# Patient Record
Sex: MALE | Race: WHITE | Employment: OTHER | URBAN - METROPOLITAN AREA
[De-identification: names, ages, dates, MRNs, and addresses within clinical notes are randomized per-mention and may not be internally consistent; named-entity substitution may affect disease eponyms.]

---

## 2019-01-11 ENCOUNTER — CONVERSION ENCOUNTER (OUTPATIENT)
Dept: ORTHOPEDIC SURGERY | Facility: CLINIC | Age: 49
End: 2019-01-11

## 2019-01-11 ENCOUNTER — OFFICE VISIT CONVERTED (OUTPATIENT)
Dept: ORTHOPEDIC SURGERY | Facility: CLINIC | Age: 49
End: 2019-01-11
Attending: ORTHOPAEDIC SURGERY

## 2019-02-05 ENCOUNTER — HOSPITAL ENCOUNTER (OUTPATIENT)
Dept: GASTROENTEROLOGY | Facility: HOSPITAL | Age: 49
Setting detail: HOSPITAL OUTPATIENT SURGERY
Discharge: HOME | End: 2019-02-05
Attending: INTERNAL MEDICINE

## 2019-02-05 LAB — GLUCOSE BLD-MCNC: 93 MG/DL (ref 70–99)

## 2020-07-10 ENCOUNTER — OFFICE VISIT CONVERTED (OUTPATIENT)
Dept: FAMILY MEDICINE CLINIC | Facility: CLINIC | Age: 50
End: 2020-07-10
Attending: NURSE PRACTITIONER

## 2020-07-10 ENCOUNTER — CONVERSION ENCOUNTER (OUTPATIENT)
Dept: FAMILY MEDICINE CLINIC | Facility: CLINIC | Age: 50
End: 2020-07-10

## 2020-08-03 ENCOUNTER — HOSPITAL ENCOUNTER (OUTPATIENT)
Dept: PHYSICAL THERAPY | Facility: CLINIC | Age: 50
Setting detail: RECURRING SERIES
Discharge: HOME | End: 2020-08-03
Attending: NURSE PRACTITIONER

## 2020-08-31 ENCOUNTER — OFFICE VISIT CONVERTED (OUTPATIENT)
Dept: OTOLARYNGOLOGY | Facility: CLINIC | Age: 50
End: 2020-08-31
Attending: OTOLARYNGOLOGY

## 2021-05-10 NOTE — H&P
History and Physical      Patient Name: Francisco Colin   Patient ID: [id number]   Sex: Male   YOB: 1970    Primary Care Provider: Adrián Causey MD   Referring Provider: Adrián Causey MD    Visit Date: July 10, 2020    Provider: NAMITA Laura   Location: [facility]   Location Address: [address] [place] [address], [address] [place] [address]  [id number]   Location Phone: [phone]          Chief Complaint  · establish care  · dizzy when standing up      History Of Present Illness  Francisco Colin is a 49 year old /White male who presents for evaluation and treatment of:      As a new patient today to establish care.  [facility]  [facility] and [facility] his [facility].    He is complaining of dizziness when he stands up for several months now.  His orthostatic blood pressures were unremarkable.  He does state that he has a history of PE tubes in his ears in the past.  He does use Zyrtec and Flonase every day.    GERD: He states he used to be on Prevacid 30 mg which always controlled his heartburn but states is not on his formulary and they changed him to pantoprazole but he complains is not as good.    Diabetes type 2, non-insulin-dependent: He is currently on Janumet XR, glimepiride as listed.  His wife states his last A1c was 9.0% in October.  He states he had labs done several months ago but his PCP said they miss getting his A1c drawn.  He states he is due to get labs drawn again next month.  He has lost 34 pounds since he was seen by Ortho in January.  He states he is working on weight loss.    Hypertension: Blood pressure is stable at 120/64 on my card is HCTZ 80-12.5 mg.    History of depression and anxiety: PHQ 9 score is 12 today.  He states he feels he is stable on buspirone and venlafaxine as listed.  He states he was only given a prescription for diazepam once or twice, states the last time was when his father passed  away.    Hypothyroidism: He is currently stable on Keldron Thyroid 30 mg daily.    History of chronic pain: He sees Dr. Causey at pain management and he has a pain pump with Dilaudid.    History of asthma: He is currently stable on pro-air inhaler.    History of hyperlipidemia: He is currently stable on Lipitor 40 mg once daily.       Past Medical History  Disease Name Date Onset Notes   Allergies --  --    Anemia --  --    Anxiety --  --    Arthritis --  --    Asthma --  --    Asthma 07/10/2020 --    Broken Bones --  --    Chest pain --  --    Chronic pain 07/10/2020 --    Depression --  --    Diabetes --  --    Diabetes mellitus, type 2 07/10/2020 --    Dizziness 07/10/2020 --    Essential hypertension 07/10/2020 --    Forgetfulness --  --    GERD (gastroesophageal reflux disease) 07/10/2020 --    Heart attack --  --    High blood pressure --  --    High cholesterol --  --    Hyperlipemia --  --    Hypertension --  --    Hypothyroidism 07/10/2020 --    Limb Swelling --  --    Reflux --  --    Seasonal allergies --  --    Shortness of Breath --  --    Sinus trouble --  --    Thyroid disorder --  --          Past Surgical History  Procedure Name Date Notes   Back --  --    carpal tunnel --  --    Colonoscopy 2009 --    Ear drum repair --  --    endoscopy --  --    Joint Surgery --  --    Knee scope with removal of cartilage from knee joint --  --    Nasal septum repair --  --    Pain Pump --  --    Vasectomy --  --          Medication List  Name Date Started Instructions   Keldron Thyroid 30 mg oral tablet  take 1 tablet (30 mg) by oral route once daily   aspirin 81 mg oral tablet,chewable  chew 1 tablet (81 mg) by oral route once daily   atorvastatin 40 mg oral tablet  take 1 tablet (40 mg) by oral route once daily   buspirone 15 mg oral tablet  take 1 tablet (15 mg) by oral route 2 times per day   diazepam 5 mg oral tablet  take 1 tablet (5 mg) by oral route 2 times per day   diclofenac potassium 50 mg oral tablet  --     diclofenac sodium 1 % topical gel  apply 4 grams to the affected area(s) by topical route 4 times per day   dicyclomine 10 mg oral capsule  take 1 capsule (10 mg) by oral route 3 times per day   Dilaudid Pain pump  --    fluticasone propionate 50 mcg/actuation nasal spray,suspension  spray 2 sprays (100 mcg) in each nostril by intranasal route once daily   glimepiride 4 mg oral tablet  take 1 tablet (4 mg) by oral route once daily   Janumet XR 50-1,000 mg oral tablet, ER multiphase 24 hr  take 2 tablets by oral route once daily with the evening meal, swallowing whole. Do not crush, chew and/or divide.   lancets miscellaneous  --    Linzess 290 mcg oral capsule  take 1 capsule (290 mcg) by oral route once daily on an empty stomach at least 30 minutes before 1st meal of the day   melatonin oral  --    Micardis HCT 80-12.5 mg oral tablet  take 1 tablet by oral route once daily   Precision miscellaneous misc  use as directed   ProAir HFA 90 mcg/actuation inhalation HFA aerosol inhaler  inhale 2 puffs (180 mcg) by inhalation route every 4 hours as needed   tizanidine 2 mg oral capsule  take 1 capsule (2 mg) by oral route every 8 hours as needed   trazodone 100 mg oral tablet  take 1 tablet (100 mg) by oral route once daily at bedtime   valacyclovir 500 mg oral tablet  take 1 tablet (500 mg) by oral route once daily   venlafaxine 150 mg oral capsule,extended release 24hr  take 1 capsule (150 mg) by oral route once daily   Zyrtec 10 mg oral tablet  take 1 tablet (10 mg) by oral route once daily         Allergy List  Allergen Name Date Reaction Notes   morphine --  metal taste in mouth --          Family Medical History  Disease Name Relative/Age Notes   Stroke Father/   Father   Heart Disease Mother/   Mother   Cancer, Unspecified Father/   Father   Diabetes, unspecified type Brother/  Father/   Father; Brother   - No Family History of Colorectal Cancer  --    Heart Attack (MI) Father/   --    Diabetes  "Father/  Mother/   --    Skin Carcinoma Father/   --    Blood Diseases Father/   Father   Family history of certain chronic disabling diseases; arthritis Father/   Father         Social History  Finding Status Start/Stop Quantity Notes   Alcohol --  --/-- --  --    Alcohol Use Never --/-- --  does not drink   lives with spouse --  --/-- --  --    . --  --/-- --  --    Recreational Drug Use Never --/-- --  no   Retired. --  --/-- --  --    Tobacco Never --/-- --  never smoker         Review of Systems  · Constitutional  o Admits  o : weight loss, intentional  o Denies  o : fever, fatigue, weight gain  · HENT  o Admits  o : vertigo, lightheadedness, ear fullness  o Denies  o : headaches, sinus pain, nasal congestion, nasal discharge, ear pain  · Cardiovascular  o Denies  o : lower extremity edema, claudication, chest pressure, palpitations  · Respiratory  o Denies  o : shortness of breath, wheezing, cough, hemoptysis, dyspnea on exertion  · Gastrointestinal  o Admits  o : heartburn, reflux  o Denies  o : nausea, vomiting, diarrhea, constipation, abdominal pain  · Genitourinary  o Denies  o : urgency, frequency  · Integument  o Denies  o : rash, itching  · Musculoskeletal  o Admits  o : back pain, hip pain, knee pain  · Psychiatric  o Admits  o : anxiety, depression, difficulty sleeping  o Denies  o : suicidal ideation, homicidal ideation  · Allergic-Immunologic  o Admits  o : sinus allergy symptoms  o Denies  o : frequent illnesses      Vitals  Date Time BP Position Site L\R Cuff Size HR RR TEMP (F) WT  HT  BMI kg/m2 BSA m2 O2 Sat HC       07/10/2020 01:53 /64 Sitting    101 - R  98 240lbs 0oz 5'  11\" 33.47 2.34 96 %        07/10/2020 02:10 /72 Supine    89 - R   07/10/2020 02:10 /74 Sitting    101 - R   07/10/2020 02:10 /70 Sitting    105 - R             Physical Examination  · Constitutional  o Appearance  o : no acute distress, well-nourished  · Head and Face  o Head  o : "   § Inspection  § : atraumatic, normocephalic  · Ears, Nose, Mouth and Throat  o Ears  o :   § External Ears  § : normal  § Otoscopic Examination  § : tympanic membrane dull in appearance-bilaterally, bilateral tympanic membranes with scar tissue also.  o Oral Cavity  o :   § Oral Mucosa  § : moist mucous membranes  o Throat  o :   § Oropharynx  § : no inflammation or lesions present  · Neck  o Thyroid  o : gland size normal, nontender, no nodules or masses present on palpation, symmetric  · Respiratory  o Respiratory Effort  o : breathing comfortably, symmetric chest rise  o Auscultation of Lungs  o : clear to asculatation bilaterally, no wheezes, rales, or rhonchii  · Cardiovascular  o Heart  o :   § Auscultation of Heart  § : regular rate and rhythm, no murmurs, rubs, or gallops  o Peripheral Vascular System  o :   § Extremities  § : no edema  · Lymphatic  o Neck  o : no lymphadenopathy present  · Skin and Subcutaneous Tissue  o General Inspection  o : no rashes present  · Neurologic  o Mental Status Examination  o :   § Orientation  § : grossly oriented to person, place and time  o Gait and Station  o :   § Gait Screening  § : normal gait  · Psychiatric  o General  o : normal mood and affect  o Presence of Abnormal Thoughts  o : no hallucinations, no delusions present, no psychotic thoughts, no homicidal ideation, no suicidal ideation, no evidence of obsessional thinking          Assessment  · Asthma     493.90/J45.909  · Diabetes mellitus, type 2     250.00/E11.9  · Essential hypertension     401.9/I10  · GERD (gastroesophageal reflux disease)     530.81/K21.9  · Hypothyroidism     244.9/E03.9  · Dizziness     780.4/R42  · Chronic pain     338.29/G89.29    Problems Reconciled  Plan  · Orders  o ACO-39: Current medications updated and reviewed () - - 07/10/2020  o ACO-18: Positive screen for clinical depression using a standardized tool and a follow-up plan documented () - - 07/10/2020   positive 12  pts.  o ENT CONSULTATION (ENTCO) - 780.4/R42 - 07/10/2020  o PHYSICAL THERAPY CONSULTATION (PHYT) - 780.4/R42 - 07/10/2020   eval and treat for dizziness  · Medications  o lansoprazole 30 mg oral capsule,delayed release(DR/EC)   SIG: take 1 capsule (30 mg) by oral route once daily before a meal for 90 days   DISP: (90) capsules with 3 refills  Prescribed on 07/10/2020     o pantoprazole 40 mg oral tablet,delayed release (DR/EC)   SIG: take 1 tablet (40 mg) by oral route once daily   DISP: (0) tablet with 0 refills  Discontinued on 07/10/2020     o Medications have been Reconciled  o Transition of Care or Provider Policy  · Instructions  o Continue blood sugar monitoring daily and record. Bring your log to office visits. Call the office for readings below 70 and above 250 or any complications.  o Discussed with patient blood pressure monitoring, hemoglobin A1C levels need to be below 7.0, and LDL (Lipid) goals below 70.  o Patient advised to monitor blood pressure (B/P) at home and journal readings. Patient informed that a B/P reading at home of more than 130/80 is considered hypertension. For readings greater enbn156/90 or higher patient is advised to follow up in the office with readings for management. Patient advised to limit sodium intake.  o Patient is taking medications as prescribed and doing well.   o Patient was educated/instructed on their diagnosis, treatment and medications prior to discharge from the clinic today.  o Patient instructed to seek medical attention urgently for new or worsening symptoms.  o Call the office with any concerns or questions.  · Disposition  o Call or Return if symptoms worsen or persist.     GERD not well controlled on pantoprazole, will send him in Prevacid 30 mg, patient will pay out-of-pocket, good Rx pricing information and card shared with patient.    Will refer him to physical therapy for his chronic dizziness and to ENT due to chronic dizziness and tympanic membrane  dysfunction.    [facility], he may follow-up as needed.             Electronically Signed by: NAMITA Laura -Author on July 10, 2020 05:25:33 PM

## 2021-05-10 NOTE — H&P
"   History and Physical      Patient Name: Francisco Colin   Patient ID: [id number]   Sex: Male   YOB: 1970    Primary Care Provider: Dimple BREAUX   Referring Provider: Dimple BREAUX    Visit Date: August 31, 2020    Provider: Avila Rucker MD   Location[facility]   Location Address: [address] [place] [address], [address]  [id number]   Location Phone: [phone]          Chief Complaint     \"I am having trouble hearing.\"       History Of Present Illness  Francisco Colin is a 50 year old /White male with past medical history significant for chronic back pain who presents to the office today as a consult from Dimple BREAUX for evaluation of his ears. He tells me that his main concern today involves bilateral hearing loss. He tells me that this has been gradual in nature and seems to improve \"about 25%\" when he pops his ears. 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 He denies any family history of hearing loss. He does report a personal history of bilateral tinnitus for which he uses background noise at home to mask. He denies any issues with otalgia, otorrhea, or vertigo. He does report a significant history of otitis media as a child. He also reports previous myringotomy and tube placement in 2003 followed by a left-sided tympanoplasty in 2005. He has not undergone a recent audiogram does report [facility].       Past Medical History  Allergies; Anemia; Anxiety; Arthritis; Asthma; Broken Bones; Chest pain; Chronic pain; Depression; Diabetes mellitus, type 2; Dizziness; Essential hypertension; Forgetfulness; GERD (gastroesophageal reflux disease); Hearing loss; Heart attack; High cholesterol; Hypothyroidism; Seasonal allergies; Shortness of Breath         Past Surgical History  Back; carpal tunnel; Colonoscopy; Ear drum repair; endoscopy; Joint Surgery; Knee scope with " "removal of cartilage from knee joint; Nasal septum repair; Pain Pump; Vasectomy         Medication List  [place] Thyroid 30 mg oral tablet; aspirin 81 mg oral tablet,chewable; atorvastatin 80 mg oral tablet; buspirone 15 mg oral tablet; diazepam 5 mg oral tablet; diclofenac sodium 1 % topical gel; dicyclomine 10 mg oral capsule; fluticasone propionate 50 mcg/actuation nasal spray,suspension; glimepiride 4 mg oral tablet; Janumet 50-1,000 mg oral tablet; Linzess 290 mcg oral capsule; Micardis HCT 80-12.5 mg oral tablet; ProAir HFA 90 mcg/actuation inhalation HFA aerosol inhaler; tizanidine 2 mg oral capsule; trazodone 100 mg oral tablet; valacyclovir 500 mg oral tablet; venlafaxine 150 mg oral capsule,extended release 24hr; Zyrtec 10 mg oral tablet         Allergy List  morphine         Family Medical History  Family history of cancer; Family history of stroke; Family history of heart disease; Family history of diabetes mellitus         Social History  Alcohol Use (Never); lives with spouse; .; Recreational Drug Use (Never); Retired.; Tobacco (Never)         Review of Systems  · Constitutional  o Admits  o : weight loss  o Denies  o : fever, night sweats  · Eyes  o Denies  o : discharge from eye, impaired vision  · HENT  o Admits  o : *See HPI  · Cardiovascular  o Denies  o : chest pain, irregular heart beats  · Respiratory  o Denies  o : shortness of breath, wheezing, coughing up blood  · Gastrointestinal  o Denies  o : heartburn, reflux, vomiting blood  · Genitourinary  o Denies  o : frequency  · Integument  o Denies  o : rash, skin dryness  · Neurologic  o Denies  o : seizures, loss of balance, loss of consciousness, dizziness  · Endocrine  o Denies  o : cold intolerance, heat intolerance  · Heme-Lymph  o Denies  o : easy bleeding, anemia      Vitals  Date Time BP Position Site L\R Cuff Size HR RR TEMP (F) WT  HT  BMI kg/m2 BSA m2 O2 Sat        08/31/2020 09:18 AM        99.1 247lbs 0oz 5'  11\" 34.45 2.37  "          Physical Examination  · Constitutional  o Appearance  o : well developed, well-nourished, alert and in no acute distress, voice clear and strong  · Head and Face  o Head  o :   § Inspection  § : no deformities or lesions  o Face  o :   § Inspection  § : No facial lesions; House-Brackmann I/VI bilaterally  § Palpation  § : No TMJ crepitus nor  muscle tenderness bilaterally  · Eyes  o Vision  o :   § Visual Fields  § : Extraocular movements are intact. No spontaneous or gaze-induced nystagmus.  o Conjunctivae  o : clear  o Sclerae  o : clear  o Pupils and Irises  o : pupils equal, round, and reactive to light.   · Ears, Nose, Mouth and Throat  o Ears  o :   § External Ears  § : appearance within normal limits, no lesions present aside from left postauricular scar  § Otoscopic Examination  § : Bilateral external auditory canals are normal in appearance. Left tympanic membrane with patches of myringosclerosis. Right tympanic membrane is slightly retracted.  § Hearing  § : intact to conversational voice both ears  o Nose  o :   § External Nose  § : appearance normal  § Intranasal Exam  § : mucosa within normal limits, vestibules normal, no intranasal lesions present, septum midline, sinuses non tender to percussion  o Oral Cavity  o :   § Oral Mucosa  § : oral mucosa normal without pallor or cyanosis  § Lips  § : lip appearance normal  § Teeth  § : Partial dentition for age  § Gums  § : gums pink, non-swollen, no bleeding present  § Tongue  § : tongue appearance normal; normal mobility  § Palate  § : hard palate normal, soft palate appearance normal with symmetric mobility  o Throat  o :   § Oropharynx  § : no inflammation or lesions present, tonsils within normal limits  · Neck  o Inspection/Palpation  o : normal appearance, no masses or tenderness, trachea midline; thyroid size normal, nontender, no nodules or masses present on palpation  · Respiratory  o Respiratory Effort  o : breathing  unlabored  o Inspection of Chest  o : normal appearance, no retractions  · Cardiovascular  o Heart  o : regular rate and rhythm  · Lymphatic  o Neck  o : no lymphadenopathy present  o Supraclavicular Nodes  o : no lymphadenopathy present  o Preauricular Nodes  o : no lymphadenopathy present  · Skin and Subcutaneous Tissue  o General Inspection  o : Regarding face and neck - there are no rashes present, no lesions present, and no areas of discoloration  · Neurologic  o Cranial Nerves  o : cranial nerves II-XII are grossly intact bilaterally  o Gait and Station  o : normal gait, able to stand without diffculty  · Psychiatric  o Judgement and Insight  o : judgment and insight intact  o Mood and Affect  o : mood normal, affect appropriate          Assessment  · Mixed conductive and sensorineural hearing loss of left ear     389.21/H90.72  · Sensorineural hearing loss of right ear     389.15/H90.41  · Tinnitus, bilateral     388.30/H93.13    Problems Reconciled  Plan  · Orders  o Audiometry, comprehensive, threshold evaluation and speech recognition [facility] ([id number]) - 388.30/H93.13, 389.21/H90.72, 389.15/H90.41 - 08/31/2020  o Tympanogram (Impedance Testing) [facility] ([id number]) - 388.30/H93.13, 389.21/H90.72, 389.15/H90.41 - 08/31/2020  · Medications  o Medications have been Reconciled  o Transition of Care or Provider Policy  · Instructions  o Impressions and findings are discussed Mr. Colin at great length. Currently, he is seen for evaluation of bilateral hearing loss and tinnitus in the setting of significant noise exposure. He also has a previous history of tubes and left-sided tympanoplasty in 2005. Examination today reveals a left tympanic membrane to be slightly thickened with some myringosclerosis on the right to be slightly retracted appearing with monomeric membrane. Same-day audiogram revealed right normal downsloping to mild to moderate high-frequency sensorineural hearing loss in the left normal downsloping to mild to  moderate mixed hearing loss. SRT is 25 on the right and 20 on the left. Speech discrimination is 100% bilaterally at 60 dB. Tympanogram was type Ad on the right and type A on the left. We discussed that I am doubtful that any otologic procedure would be helpful for his hearing loss and he is cleared for binaural amplification. I [facility] as [facility]. He was given ample time to ask questions, all of which were answered to the best of my ability.  · Correspondence  o ENT Letter to Referring MD (Dimple BREAUX) - 08/31/2020            Electronically Signed by: Avila Rucker MD -Author on August 31, 2020 10:35:52 AM

## 2021-05-14 VITALS — HEIGHT: 71 IN | BODY MASS INDEX: 34.58 KG/M2 | TEMPERATURE: 99.1 F | WEIGHT: 247 LBS

## 2021-05-15 VITALS — SYSTOLIC BLOOD PRESSURE: 114 MMHG | HEART RATE: 105 BPM | DIASTOLIC BLOOD PRESSURE: 70 MMHG

## 2021-05-15 VITALS
DIASTOLIC BLOOD PRESSURE: 64 MMHG | HEART RATE: 101 BPM | SYSTOLIC BLOOD PRESSURE: 120 MMHG | OXYGEN SATURATION: 96 % | WEIGHT: 240 LBS | HEIGHT: 71 IN | TEMPERATURE: 98 F | BODY MASS INDEX: 33.6 KG/M2

## 2021-05-15 VITALS — HEART RATE: 70 BPM | BODY MASS INDEX: 38.41 KG/M2 | WEIGHT: 274.37 LBS | OXYGEN SATURATION: 92 % | HEIGHT: 71 IN

## 2021-08-14 PROCEDURE — U0003 INFECTIOUS AGENT DETECTION BY NUCLEIC ACID (DNA OR RNA); SEVERE ACUTE RESPIRATORY SYNDROME CORONAVIRUS 2 (SARS-COV-2) (CORONAVIRUS DISEASE [COVID-19]), AMPLIFIED PROBE TECHNIQUE, MAKING USE OF HIGH THROUGHPUT TECHNOLOGIES AS DESCRIBED BY CMS-2020-01-R: HCPCS | Performed by: PHYSICIAN ASSISTANT

## 2021-08-14 PROCEDURE — U0005 INFEC AGEN DETEC AMPLI PROBE: HCPCS | Performed by: PHYSICIAN ASSISTANT

## 2024-04-02 ENCOUNTER — TELEPHONE (OUTPATIENT)
Dept: GASTROENTEROLOGY | Facility: CLINIC | Age: 54
End: 2024-04-02
Payer: MEDICARE

## 2024-04-15 ENCOUNTER — PREP FOR SURGERY (OUTPATIENT)
Dept: OTHER | Facility: HOSPITAL | Age: 54
End: 2024-04-15
Payer: MEDICARE

## 2024-04-15 ENCOUNTER — CLINICAL SUPPORT (OUTPATIENT)
Dept: GASTROENTEROLOGY | Facility: CLINIC | Age: 54
End: 2024-04-15
Payer: MEDICARE

## 2024-04-15 DIAGNOSIS — Z86.010 HISTORY OF COLON POLYPS: Primary | ICD-10-CM

## 2024-04-15 DIAGNOSIS — K63.5 POLYP OF COLON, UNSPECIFIED PART OF COLON, UNSPECIFIED TYPE: Primary | ICD-10-CM

## 2024-04-15 PROBLEM — Z86.0100 HISTORY OF COLON POLYPS: Status: ACTIVE | Noted: 2024-04-15

## 2024-04-15 RX ORDER — DICLOFENAC POTASSIUM 50 MG/1
TABLET, FILM COATED ORAL
COMMUNITY

## 2024-04-15 RX ORDER — DICYCLOMINE HYDROCHLORIDE 10 MG/1
CAPSULE ORAL
COMMUNITY

## 2024-04-15 RX ORDER — LANSOPRAZOLE 30 MG/1
1 CAPSULE, DELAYED RELEASE ORAL EVERY 24 HOURS
COMMUNITY

## 2024-04-15 RX ORDER — ESCITALOPRAM OXALATE 20 MG/1
1 TABLET ORAL DAILY
COMMUNITY

## 2024-04-15 RX ORDER — VENLAFAXINE HYDROCHLORIDE 150 MG/1
CAPSULE, EXTENDED RELEASE ORAL
COMMUNITY

## 2024-04-15 RX ORDER — BUSPIRONE HYDROCHLORIDE 15 MG/1
TABLET ORAL
COMMUNITY

## 2024-04-15 RX ORDER — PANTOPRAZOLE SODIUM 40 MG/1
TABLET, DELAYED RELEASE ORAL
COMMUNITY

## 2024-04-15 RX ORDER — VALACYCLOVIR HYDROCHLORIDE 500 MG/1
TABLET, FILM COATED ORAL
COMMUNITY

## 2024-04-15 RX ORDER — ERGOCALCIFEROL 1.25 MG/1
CAPSULE ORAL
COMMUNITY

## 2024-04-15 RX ORDER — ASPIRIN 81 MG/1
TABLET, CHEWABLE ORAL
COMMUNITY

## 2024-04-15 NOTE — PROGRESS NOTES
Francisco Colin  1970  53 y.o.    Reason for call: Recall  Prep prescribed: Moviprep pt reports already [facility]   Prep instructions reviewed with patient and sent to patient via regular mail to the home address on file  Is the patient currently on any injectable medications for weight loss or diabetes? No  Clearance needed? No  If yes, what clearance is needed? N/A  Clearance has been requested from N/A   The patient has been scheduled for: Colonoscopy  After your procedure, you will be contacted with results. Please confirm the best phone # to reach the patient: [phone]  Family history of colon cancer? No  If yes, indicate relative: n/a   Family History   Problem Relation Age of Onset    Skin cancer Father     Stroke Father     Heart failure Father     Diabetes Father      Past Medical History:   Diagnosis Date    Arthritis     Asthma     Depression     Diabetes mellitus     Disease of thyroid gland     GERD (gastroesophageal reflux disease)     Hypertension     Injury of back     Migraine      Allergies   Allergen Reactions    Morphine Unknown - Low Severity     Past Surgical History:   Procedure Laterality Date    CARPAL TUNNEL RELEASE      COLONOSCOPY      ENDOSCOPY      PAIN PUMP INSERTION/REVISION      ULNAR NERVE REPAIR       Social History     Socioeconomic History    Marital status:    Tobacco Use    Smoking status: Never    Smokeless tobacco: Never   Substance and Sexual Activity    Alcohol use: Never    Drug use: Never       Current Outpatient Medications:     albuterol (PROAIR RESPICLICK) 108 (90 Base) MCG/ACT inhaler, , Disp: , Rfl:     albuterol sulfate HFA (Ventolin HFA) 108 (90 Base) MCG/ACT inhaler, Every 4 (Four) Hours., Disp: , Rfl:     Alcohol Swabs (Alcohol Prep) 70 % pads, as directed, Disp: , Rfl:     aspirin 81 MG chewable tablet, aspirin 81 mg oral tablet,chewable chew 1 tablet (81 mg) by oral route once daily   Active, Disp: , Rfl:     atorvastatin (LIPITOR) 80  MG tablet, Daily., Disp: , Rfl:     busPIRone (BUSPAR) 15 MG tablet, buspirone 15 mg oral tablet take 1 tablet (15 mg) by oral route 2 times per day   Active, Disp: , Rfl:     Cetirizine HCl (ZyrTEC Allergy) 10 MG capsule, as directed, Disp: , Rfl:     diclofenac (CATAFLAM) 50 MG tablet, , Disp: , Rfl:     Diclofenac Sodium (VOLTAREN) 1 % gel gel, 4 g., Disp: , Rfl:     dicyclomine (BENTYL) 10 MG capsule, dicyclomine 10 mg oral capsule take 1 capsule (10 mg) by oral route 3 times per day   Active, Disp: , Rfl:     ergocalciferol (ERGOCALCIFEROL) 1.25 MG (52257 UT) capsule, , Disp: , Rfl:     escitalopram (LEXAPRO) 20 MG tablet, Take 1 tablet by mouth Daily., Disp: , Rfl:     fluticasone (FLONASE) 50 MCG/ACT nasal spray, Daily., Disp: , Rfl:     fluticasone-salmeterol (Advair Diskus) 250-50 MCG/DOSE DISKUS, , Disp: , Rfl:     glimepiride (AMARYL) 4 MG tablet, Daily., Disp: , Rfl:     Glucose Blood (BLOOD GLUCOSE TEST VI), Every 12 (Twelve) Hours., Disp: , Rfl:     glucose monitor monitoring kit, as directed, Disp: , Rfl:     HYDROmorphone (Dilaudid) 2 MG/ML injection, Every 4 (Four) Hours., Disp: , Rfl:     HYDROmorphone HCl (DILAUDID-HP IJ), , Disp: , Rfl:     Lancets (freestyle) lancets, Every 12 (Twelve) Hours., Disp: , Rfl:     lansoprazole (PREVACID) 30 MG capsule, 1 capsule Daily., Disp: , Rfl:     linaclotide (Linzess) 290 MCG capsule capsule, Daily., Disp: , Rfl:     Melatonin 5 MG/15ML liquid, 2 (two) times a day., Disp: , Rfl:     pantoprazole (PROTONIX) 40 MG EC tablet, pantoprazole 40 mg oral tablet,delayed release (DR/EC) take 1 tablet (40 mg) by oral route once daily   Suspended, Disp: , Rfl:     SITagliptin-metFORMIN HCl ER (JANUMET XR)  MG tablet, Daily., Disp: , Rfl:     telmisartan-hydrochlorothiazide (MICARDIS HCT) 80-12.5 MG per tablet, Daily., Disp: , Rfl:     Testosterone Cypionate 200 MG/ML solution, 1 ml, Disp: , Rfl:     Thyroid (ARMOUR THYROID) 30 MG PO tablet, Daily., Disp: , Rfl:      TiZANidine (ZANAFLEX) 2 MG capsule, Every 8 (Eight) Hours., Disp: , Rfl:     TRAZODONE 100MG/5ML LIQUID, , Disp: , Rfl:     valACYclovir (VALTREX) 500 MG tablet, valacyclovir 500 mg oral tablet take 1 tablet (500 mg) by oral route once daily   Active, Disp: , Rfl:     venlafaxine XR (EFFEXOR-XR) 150 MG 24 hr capsule, venlafaxine 150 mg oral capsule,extended release 24hr take 1 capsule (150 mg) by oral route once daily   Active, Disp: , Rfl:

## 2024-05-08 NOTE — PRE-PROCEDURE INSTRUCTIONS
"PAT call attempted.  No answer.  Detailed message with date and arrival time of 0630 given.  Come to entrance \"C\"; must have adult  for transportation home; may have two visitors; however, children under 12 must remain in waiting area; instructed on diet/clear liquids/NPO/bowel prep, if needed; may take normal meds two hours prior to arrival time except for blood thinners, antidiabetics, diuretics, and weight loss meds.  Instructed to return call to confirm receipt of instructions and for any questions.  "

## 2024-05-13 ENCOUNTER — ANESTHESIA EVENT (OUTPATIENT)
Dept: GASTROENTEROLOGY | Facility: HOSPITAL | Age: 54
End: 2024-05-13
Payer: MEDICARE

## 2024-05-13 NOTE — ANESTHESIA PREPROCEDURE EVALUATION
Anesthesia Evaluation     Patient summary reviewed and Nursing notes reviewed   NPO Solid Status: > 8 hours  NPO Liquid Status: > 4 hours           Airway   Mallampati: II  TM distance: >3 FB  Neck ROM: full  No difficulty expected  Dental - normal exam     Pulmonary - normal exam    breath sounds clear to auscultation  (+) asthma (mild - no inhaler),  Cardiovascular - normal exam  Exercise tolerance: good (4-7 METS)    Rhythm: regular  Rate: normal    (+) hypertension well controlled      Neuro/Psych  (+) headaches, psychiatric history Depression  GI/Hepatic/Renal/Endo    (+) obesity, GERD well controlled, diabetes mellitus type 2 well controlled, thyroid problem     Musculoskeletal     Abdominal    Substance History      OB/GYN          Other   arthritis,     ROS/Med Hx Other: No EKG on file                   Anesthesia Plan    ASA 3     general   total IV anesthesia  (Total IV Anesthesia    Patient understands anesthesia not responsible for dental damage.  )  intravenous induction     Anesthetic plan, risks, benefits, and alternatives have been provided, discussed and informed consent has been obtained with: patient and spouse/significant other.  Pre-procedure education provided  Plan discussed with CRNA.      CODE STATUS:

## 2024-05-14 ENCOUNTER — TELEPHONE (OUTPATIENT)
Dept: GASTROENTEROLOGY | Facility: CLINIC | Age: 54
End: 2024-05-14
Payer: MEDICARE

## 2024-05-14 ENCOUNTER — HOSPITAL ENCOUNTER (OUTPATIENT)
Facility: HOSPITAL | Age: 54
Setting detail: HOSPITAL OUTPATIENT SURGERY
Discharge: HOME | End: 2024-05-14
Attending: INTERNAL MEDICINE | Admitting: INTERNAL MEDICINE
Payer: MEDICARE

## 2024-05-14 ENCOUNTER — ANESTHESIA (OUTPATIENT)
Dept: GASTROENTEROLOGY | Facility: HOSPITAL | Age: 54
End: 2024-05-14
Payer: MEDICARE

## 2024-05-14 VITALS
DIASTOLIC BLOOD PRESSURE: 75 MMHG | HEART RATE: 76 BPM | HEIGHT: 72 IN | TEMPERATURE: 98.5 F | SYSTOLIC BLOOD PRESSURE: 136 MMHG | BODY MASS INDEX: 31.65 KG/M2 | RESPIRATION RATE: 17 BRPM | WEIGHT: 233.69 LBS | OXYGEN SATURATION: 94 %

## 2024-05-14 LAB — GLUCOSE BLDC GLUCOMTR-MCNC: 104 MG/DL (ref 70–99)

## 2024-05-14 PROCEDURE — 82948 REAGENT STRIP/BLOOD GLUCOSE: CPT

## 2024-05-14 PROCEDURE — 25810000003 LACTATED RINGERS PER 1000 ML

## 2024-05-14 PROCEDURE — 25010000002 PROPOFOL 10 MG/ML EMULSION

## 2024-05-14 RX ORDER — LIDOCAINE HYDROCHLORIDE 20 MG/ML
INJECTION, SOLUTION EPIDURAL; INFILTRATION; INTRACAUDAL; PERINEURAL AS NEEDED
Status: DISCONTINUED | OUTPATIENT
Start: 2024-05-14 | End: 2024-05-14 | Stop reason: SURG

## 2024-05-14 RX ORDER — SODIUM CHLORIDE, SODIUM LACTATE, POTASSIUM CHLORIDE, CALCIUM CHLORIDE 600; 310; 30; 20 MG/100ML; MG/100ML; MG/100ML; MG/100ML
INJECTION, SOLUTION INTRAVENOUS CONTINUOUS PRN
Status: DISCONTINUED | OUTPATIENT
Start: 2024-05-14 | End: 2024-05-14 | Stop reason: SURG

## 2024-05-14 RX ORDER — PROPOFOL 10 MG/ML
VIAL (ML) INTRAVENOUS AS NEEDED
Status: DISCONTINUED | OUTPATIENT
Start: 2024-05-14 | End: 2024-05-14 | Stop reason: SURG

## 2024-05-14 RX ORDER — SODIUM CHLORIDE, SODIUM LACTATE, POTASSIUM CHLORIDE, CALCIUM CHLORIDE 600; 310; 30; 20 MG/100ML; MG/100ML; MG/100ML; MG/100ML
30 INJECTION, SOLUTION INTRAVENOUS CONTINUOUS
Status: DISCONTINUED | OUTPATIENT
Start: 2024-05-14 | End: 2024-05-14 | Stop reason: HOSPADM

## 2024-05-14 RX ADMIN — LIDOCAINE HYDROCHLORIDE 100 MG: 20 INJECTION, SOLUTION EPIDURAL; INFILTRATION; INTRACAUDAL; PERINEURAL at 07:39

## 2024-05-14 RX ADMIN — PROPOFOL 50 MG: 10 INJECTION, EMULSION INTRAVENOUS at 07:39

## 2024-05-14 RX ADMIN — SODIUM CHLORIDE, POTASSIUM CHLORIDE, SODIUM LACTATE AND CALCIUM CHLORIDE 30 ML/HR: 600; 310; 30; 20 INJECTION, SOLUTION INTRAVENOUS at 07:07

## 2024-05-14 RX ADMIN — SODIUM CHLORIDE, POTASSIUM CHLORIDE, SODIUM LACTATE AND CALCIUM CHLORIDE: 600; 310; 30; 20 INJECTION, SOLUTION INTRAVENOUS at 07:38

## 2024-05-14 RX ADMIN — PROPOFOL 200 MCG/KG/MIN: 10 INJECTION, EMULSION INTRAVENOUS at 07:40

## 2024-05-14 NOTE — ANESTHESIA POSTPROCEDURE EVALUATION
Patient: Francisco Colin    Procedure Summary       Date: 05/14/24 Room / Location: [facility] ENDOSCOPY 1 / [facility] ENDOSCOPY    Anesthesia Start: 0737 Anesthesia Stop: 0804    Procedure: COLONOSCOPY Diagnosis:       History of colon polyps      (History of colon polyps [Z86.010])    Surgeons: Rosetta Barclay MD Provider: Vonnie Valdes CRNA    Anesthesia Type: general ASA Status: 3            Anesthesia Type: general    Vitals  Vitals Value Taken Time   /75 05/14/24 0818   Temp 36.9 °C (98.5 °F) 05/14/24 0817   Pulse 71 05/14/24 0819   Resp 17 05/14/24 0817   SpO2 93 % 05/14/24 0819   Vitals shown include unfiled device data.        Post Anesthesia Care and Evaluation    Post-procedure mental status: acceptable.  Pain management: satisfactory to patient    Airway patency: patent  Anesthetic complications: No anesthetic complications    Cardiovascular status: acceptable  Respiratory status: acceptable and room air    Comments: Per chart review

## 2024-05-14 NOTE — H&P
Pre Procedure History & Physical    Chief Complaint:   Screening colonoscopy    Subjective     HPI:   52 yo M here for screening colonoscopy.    Past Medical History:   Past Medical History:   Diagnosis Date    Arthritis     Asthma     Depression     Diabetes mellitus     Disease of thyroid gland     GERD (gastroesophageal reflux disease)     Hypertension     Injury of back     Migraine        Past Surgical History:  Past Surgical History:   Procedure Laterality Date    CARPAL TUNNEL RELEASE      COLONOSCOPY      ENDOSCOPY      PAIN PUMP INSERTION/REVISION      ULNAR NERVE REPAIR      UPPER GASTROINTESTINAL ENDOSCOPY         Family History:  Family History   Problem Relation Age of Onset    Skin cancer Father     Stroke Father     Heart failure Father     Diabetes Father        Social History:   reports that he has never smoked. He has never used smokeless tobacco. He reports that he does not drink alcohol and does not use drugs.    Medications:   Medications Prior to Admission   Medication Sig Dispense Refill Last Dose    albuterol (PROAIR RESPICLICK) 108 (90 Base) MCG/ACT inhaler        albuterol sulfate HFA (Ventolin HFA) 108 (90 Base) MCG/ACT inhaler Every 4 (Four) Hours.       Alcohol Swabs (Alcohol Prep) 70 % pads as directed       aspirin 81 MG chewable tablet aspirin 81 mg oral tablet,chewable chew 1 tablet (81 mg) by oral route once daily   Active       atorvastatin (LIPITOR) 80 MG tablet Daily.       busPIRone (BUSPAR) 15 MG tablet buspirone 15 mg oral tablet take 1 tablet (15 mg) by oral route 2 times per day   Active       Cetirizine HCl (ZyrTEC Allergy) 10 MG capsule as directed       diclofenac (CATAFLAM) 50 MG tablet        Diclofenac Sodium (VOLTAREN) 1 % gel gel 4 g.       dicyclomine (BENTYL) 10 MG capsule dicyclomine 10 mg oral capsule take 1 capsule (10 mg) by oral route 3 times per day   Active       ergocalciferol (ERGOCALCIFEROL) 1.25 MG (29767 UT) capsule        escitalopram (LEXAPRO) 20 MG  tablet Take 1 tablet by mouth Daily.       fluticasone (FLONASE) 50 MCG/ACT nasal spray Daily.       fluticasone-salmeterol (Advair Diskus) 250-50 MCG/DOSE DISKUS        glimepiride (AMARYL) 4 MG tablet Daily.       Glucose Blood (BLOOD GLUCOSE TEST VI) Every 12 (Twelve) Hours.       glucose monitor monitoring kit as directed       HYDROmorphone (Dilaudid) 2 MG/ML injection Every 4 (Four) Hours.       HYDROmorphone HCl (DILAUDID-HP IJ)        Lancets (freestyle) lancets Every 12 (Twelve) Hours.       lansoprazole (PREVACID) 30 MG capsule 1 capsule Daily.       linaclotide (Linzess) 290 MCG capsule capsule Daily.       Melatonin 5 MG/15ML liquid 2 (two) times a day.       pantoprazole (PROTONIX) 40 MG EC tablet pantoprazole 40 mg oral tablet,delayed release (DR/EC) take 1 tablet (40 mg) by oral route once daily   Suspended       SITagliptin-metFORMIN HCl ER (JANUMET XR)  MG tablet Daily.       telmisartan-hydrochlorothiazide (MICARDIS HCT) 80-12.5 MG per tablet Daily.       Testosterone Cypionate 200 MG/ML solution 1 ml       Thyroid (ARMOUR THYROID) 30 MG PO tablet Daily.       TiZANidine (ZANAFLEX) 2 MG capsule Every 8 (Eight) Hours.       TRAZODONE 100MG/5ML LIQUID        valACYclovir (VALTREX) 500 MG tablet valacyclovir 500 mg oral tablet take 1 tablet (500 mg) by oral route once daily   Active       venlafaxine XR (EFFEXOR-XR) 150 MG 24 hr capsule venlafaxine 150 mg oral capsule,extended release 24hr take 1 capsule (150 mg) by oral route once daily   Active          Allergies:  Morphine    ROS:    Pertinent items are noted in HPI     Objective     Weight 106 kg (233 lb 11 oz).    Physical Exam   Constitutional: Pt is oriented to person, place, and time and well-developed, well-nourished, and in no distress.   Mouth/Throat: Oropharynx is clear and moist.   Neck: Normal range of motion.   Cardiovascular: Normal rate, regular rhythm and normal heart sounds.    Pulmonary/Chest: Effort normal and breath sounds  normal.   Abdominal: Soft. Nontender  Skin: Skin is warm and dry.   Psychiatric: Mood, memory, affect and judgment normal.     Assessment & Plan     Diagnosis:  Screening colonoscopy    Anticipated Surgical Procedure:  Colonoscopy    The risks, benefits, and alternatives of this procedure have been discussed with the patient or the responsible party- the patient understands and agrees to proceed.

## 2024-05-14 NOTE — TELEPHONE ENCOUNTER
Colonoscopy was normal.  Prep quality was inadequate.  Recall colonoscopy in 1 year.  Please send letter to patient and PCP.

## 2024-05-14 NOTE — TELEPHONE ENCOUNTER
Pt scheduled appt on 5/21. Pt aware of date, time, location. Reminder also mailed. Sent to MA for notification.

## 2024-05-21 ENCOUNTER — OFFICE VISIT (OUTPATIENT)
Dept: GASTROENTEROLOGY | Facility: CLINIC | Age: 54
End: 2024-05-21
Payer: MEDICARE

## 2024-05-21 ENCOUNTER — TELEPHONE (OUTPATIENT)
Dept: GASTROENTEROLOGY | Facility: CLINIC | Age: 54
End: 2024-05-21
Payer: MEDICARE

## 2024-05-21 VITALS
DIASTOLIC BLOOD PRESSURE: 74 MMHG | BODY MASS INDEX: 32.53 KG/M2 | WEIGHT: 240.2 LBS | HEIGHT: 72 IN | HEART RATE: 76 BPM | SYSTOLIC BLOOD PRESSURE: 134 MMHG

## 2024-05-21 DIAGNOSIS — K59.03 DRUG-INDUCED CONSTIPATION: Primary | ICD-10-CM

## 2024-05-21 DIAGNOSIS — K21.9 GASTROESOPHAGEAL REFLUX DISEASE, UNSPECIFIED WHETHER ESOPHAGITIS PRESENT: ICD-10-CM

## 2024-05-21 DIAGNOSIS — Z86.010 HISTORY OF COLON POLYPS: ICD-10-CM

## 2024-05-21 PROCEDURE — 99214 OFFICE O/P EST MOD 30 MIN: CPT | Performed by: NURSE PRACTITIONER

## 2024-05-21 PROCEDURE — 1159F MED LIST DOCD IN RCRD: CPT | Performed by: NURSE PRACTITIONER

## 2024-05-21 PROCEDURE — 1160F RVW MEDS BY RX/DR IN RCRD: CPT | Performed by: NURSE PRACTITIONER

## 2024-05-21 RX ORDER — LACTULOSE 10 G/15ML
20 SOLUTION ORAL 2 TIMES DAILY
Qty: 1800 ML | Refills: 0 | Status: SHIPPED | OUTPATIENT
Start: 2024-05-21 | End: 2024-06-20

## 2024-05-21 NOTE — PROGRESS NOTES
Chief Complaint   Constipation    History of Present Illness       Francisco Colin is a 53 y.o. male who presents to [facility] for follow-up For constipation.  He is new to me today.      He does have a history of chronic constipation--- Linzess 290----on chronic pain medication with dilaudid pain pump. Denies any rectal bleeding or melena. Reports the LINZESS is not working. He will have a BM every 4-5 days. Used to be up to 9 days without a BM. Has tried everything OTC without relief.     GERD--- does well with Protonix 40 mg daily. Denies any breakthrough reflux or dysphagia.     He underwent colonoscopy with Dr. Barclay for personal history of colon polyps on 5/14/2024.  Colonoscopy showed preparation of the colon was inadequate.  Hemorrhoids found on perianal exam.  The entire examined colon is normal.  No specimens collected.  Repeat colonoscopy in 1 year for surveillance due to quality of bowel prep.    GI family history----Maternal aunt with pancreatic cancer.   Results       Result Review :                             Past Medical History       Past Medical History:   Diagnosis Date    Arthritis     Asthma     Depression     Diabetes mellitus     Disease of thyroid gland     GERD (gastroesophageal reflux disease)     Hypertension     Injury of back     Migraine        Past Surgical History:   Procedure Laterality Date    CARPAL TUNNEL RELEASE      COLONOSCOPY      COLONOSCOPY N/A 5/14/2024    Procedure: COLONOSCOPY;  Surgeon: Rosetta Barclay MD;  Location: [facility] ENDOSCOPY;  Service: Gastroenterology;  Laterality: N/A;  Hemorrhoids    ENDOSCOPY      PAIN PUMP INSERTION/REVISION      ULNAR NERVE REPAIR      UPPER GASTROINTESTINAL ENDOSCOPY           Current Outpatient Medications:     albuterol sulfate HFA (Ventolin HFA) 108 (90 Base) MCG/ACT inhaler, Every 4 (Four) Hours., Disp: , Rfl:     Alcohol Swabs (Alcohol Prep) 70 % pads, as directed, Disp: , Rfl:     aspirin  81 MG chewable tablet, aspirin 81 mg oral tablet,chewable chew 1 tablet (81 mg) by oral route once daily   Active, Disp: , Rfl:     atorvastatin (LIPITOR) 80 MG tablet, Daily., Disp: , Rfl:     busPIRone (BUSPAR) 15 MG tablet, buspirone 15 mg oral tablet take 1 tablet (15 mg) by oral route 2 times per day   Active, Disp: , Rfl:     Cetirizine HCl (ZyrTEC Allergy) 10 MG capsule, as directed, Disp: , Rfl:     diclofenac (CATAFLAM) 50 MG tablet, , Disp: , Rfl:     Diclofenac Sodium (VOLTAREN) 1 % gel gel, 4 g., Disp: , Rfl:     dicyclomine (BENTYL) 10 MG capsule, dicyclomine 10 mg oral capsule take 1 capsule (10 mg) by oral route 3 times per day   Active, Disp: , Rfl:     ergocalciferol (ERGOCALCIFEROL) 1.25 MG (13130 UT) capsule, , Disp: , Rfl:     escitalopram (LEXAPRO) 20 MG tablet, Take 1 tablet by mouth Daily., Disp: , Rfl:     fluticasone (FLONASE) 50 MCG/ACT nasal spray, Daily., Disp: , Rfl:     fluticasone-salmeterol (Advair Diskus) 250-50 MCG/DOSE DISKUS, , Disp: , Rfl:     glimepiride (AMARYL) 4 MG tablet, Daily., Disp: , Rfl:     Glucose Blood (BLOOD GLUCOSE TEST VI), Every 12 (Twelve) Hours., Disp: , Rfl:     glucose monitor monitoring kit, as directed, Disp: , Rfl:     HYDROmorphone (Dilaudid) 2 MG/ML injection, Every 4 (Four) Hours., Disp: , Rfl:     HYDROmorphone HCl (DILAUDID-HP IJ), , Disp: , Rfl:     Lancets (freestyle) lancets, Every 12 (Twelve) Hours., Disp: , Rfl:     linaclotide (Linzess) 290 MCG capsule capsule, Daily., Disp: , Rfl:     Melatonin 5 MG/15ML liquid, 2 (two) times a day., Disp: , Rfl:     pantoprazole (PROTONIX) 40 MG EC tablet, pantoprazole 40 mg oral tablet,delayed release (DR/EC) take 1 tablet (40 mg) by oral route once daily   Suspended, Disp: , Rfl:     SITagliptin-metFORMIN HCl ER (JANUMET XR)  MG tablet, Daily., Disp: , Rfl:     telmisartan-hydrochlorothiazide (MICARDIS HCT) 80-12.5 MG per tablet, Daily., Disp: , Rfl:     Testosterone Cypionate 200 MG/ML solution, 1  "ml, Disp: , Rfl:     Thyroid (ARMOUR THYROID) 30 MG PO tablet, Daily., Disp: , Rfl:     TiZANidine (ZANAFLEX) 2 MG capsule, Every 8 (Eight) Hours., Disp: , Rfl:     TRAZODONE 100MG/5ML LIQUID, , Disp: , Rfl:     valACYclovir (VALTREX) 500 MG tablet, valacyclovir 500 mg oral tablet take 1 tablet (500 mg) by oral route once daily   Active, Disp: , Rfl:     venlafaxine XR (EFFEXOR-XR) 150 MG 24 hr capsule, venlafaxine 150 mg oral capsule,extended release 24hr take 1 capsule (150 mg) by oral route once daily   Active, Disp: , Rfl:     lactulose (CHRONULAC) 10 GM/15ML solution, Take 30 mL by mouth 2 (Two) Times a Day for 30 days., Disp: 1800 mL, Rfl: 0    Naloxegol Oxalate (Movantik) 25 MG tablet, Take 1 tablet by mouth Every Morning., Disp: 90 tablet, Rfl: 3     Allergies   Allergen Reactions    Morphine Unknown - Low Severity       Family History   Problem Relation Age of Onset    Skin cancer Father     Stroke Father     Heart failure Father     Diabetes Father         Social History     Social History Narrative    Not on file       Objective       Review of Systems   Constitutional:  Negative for appetite change, fatigue, fever, unexpected weight gain and unexpected weight loss.   HENT:  Negative for trouble swallowing.    Respiratory:  Negative for cough, choking, chest tightness, shortness of breath, wheezing and stridor.    Cardiovascular:  Negative for chest pain, palpitations and leg swelling.   Gastrointestinal:  Positive for abdominal distention, abdominal pain and constipation. Negative for anal bleeding, blood in stool, diarrhea, nausea, rectal pain, vomiting, GERD and indigestion.        Vital Signs:   /74 (BP Location: Left arm, Patient Position: Sitting, Cuff Size: Adult)   Pulse 76   Ht 182.9 cm (72\")   Wt 109 kg (240 lb 3.2 oz)   BMI 32.58 kg/m²       Physical Exam  Constitutional:       General: He is not in acute distress.     Appearance: He is well-developed. He is not ill-appearing.   HENT: "      Head: Normocephalic.   Eyes:      Pupils: Pupils are equal, round, and reactive to light.   Cardiovascular:      Rate and Rhythm: Normal rate and regular rhythm.      Heart sounds: Normal heart sounds.   Pulmonary:      Effort: Pulmonary effort is normal.      Breath sounds: Normal breath sounds.   Abdominal:      General: Bowel sounds are normal. There is distension.      Palpations: Abdomen is soft. There is no mass.      Tenderness: There is no abdominal tenderness. There is no guarding or rebound.      Hernia: No hernia is present.   Musculoskeletal:         General: Normal range of motion.   Skin:     General: Skin is warm and dry.   Neurological:      Mental Status: He is alert and oriented to person, place, and time.   Psychiatric:         Speech: Speech normal.         Behavior: Behavior normal.         Judgment: Judgment normal.           Assessment & Plan          Assessment and Plan    Diagnoses and all orders for this visit:    1. Drug-induced constipation (Primary)  -     Naloxegol Oxalate (Movantik) 25 MG tablet; Take 1 tablet by mouth Every Morning.  Dispense: 90 tablet; Refill: 3  -     lactulose (CHRONULAC) 10 GM/15ML solution; Take 30 mL by mouth 2 (Two) Times a Day for 30 days.  Dispense: 1800 mL; Refill: 0    2. Gastroesophageal reflux disease, unspecified whether esophagitis present    3. History of colon polyps      Constipation is not well-controlled at this time.  Stop Linzess.  Will start Movantik 25 mg daily.  This requires a prior authorization.  While waiting for the PA we will start lactulose just to make sure he has tried that to help make sure the prior authorization goes through for the Movantik.  GERD seems well-controlled on Protonix 40 mg daily.  Continue GERD precautions.  Patient to call the office in 1 to 2 weeks with an update.  Patient to follow-up with me in 3 months.  Patient is agreeable to the plan.          Follow Up       Follow Up   Return in about 3 months (around  8/21/2024) for CONSTIPATION, GERD.  Patient was given instructions and counseling regarding his condition or for health maintenance advice. Please see specific information pulled into the AVS if appropriate.

## 2024-05-21 NOTE — PATIENT INSTRUCTIONS
Stop LINZESS   Start lactulose twice a day for constipation while we wait for approval for MOVANTIK     Once you get MOVANTIK you can stop lactulose and trial movantik and we will see what you liked the best     Give me update in 2-3 weeks

## 2024-07-17 ENCOUNTER — TELEPHONE (OUTPATIENT)
Dept: GASTROENTEROLOGY | Facility: CLINIC | Age: 54
End: 2024-07-17

## 2024-07-17 RX ORDER — LACTULOSE 10 G/15ML
20 SOLUTION ORAL 2 TIMES DAILY
Qty: 1800 ML | Refills: 3 | Status: SHIPPED | OUTPATIENT
Start: 2024-07-17

## 2024-07-17 NOTE — TELEPHONE ENCOUNTER
Provider: KATERINA    Caller: LISA    Relationship to Patient: SPOUSE    Pharm[facility]: [facility]    Phone Number: [phone]    Reason for Call: LACTULOS SOLUTION IS WORKING THE BEST AND WOULD LIKE A PRESCRIPTION FOR IT.

## 2024-09-03 ENCOUNTER — TELEPHONE (OUTPATIENT)
Dept: GASTROENTEROLOGY | Facility: CLINIC | Age: 54
End: 2024-09-03

## 2024-09-03 NOTE — TELEPHONE ENCOUNTER
Spoke to patient regarding his appointment with Beatrice today and advised that we will need to reschedule him due to Beatrice being out of office. Patient verbalized understanding and was rescheduled to 9/17 at 1:45pm.

## 2024-09-17 ENCOUNTER — OFFICE VISIT (OUTPATIENT)
Dept: GASTROENTEROLOGY | Facility: CLINIC | Age: 54
End: 2024-09-17
Payer: MEDICARE

## 2024-09-17 ENCOUNTER — TELEPHONE (OUTPATIENT)
Dept: GASTROENTEROLOGY | Facility: CLINIC | Age: 54
End: 2024-09-17

## 2024-09-17 VITALS
BODY MASS INDEX: 33 KG/M2 | SYSTOLIC BLOOD PRESSURE: 118 MMHG | HEIGHT: 72 IN | WEIGHT: 243.6 LBS | HEART RATE: 73 BPM | DIASTOLIC BLOOD PRESSURE: 72 MMHG

## 2024-09-17 DIAGNOSIS — K21.9 GASTROESOPHAGEAL REFLUX DISEASE, UNSPECIFIED WHETHER ESOPHAGITIS PRESENT: ICD-10-CM

## 2024-09-17 DIAGNOSIS — Z86.010 HISTORY OF COLON POLYPS: ICD-10-CM

## 2024-09-17 DIAGNOSIS — K59.03 DRUG-INDUCED CONSTIPATION: Primary | ICD-10-CM

## 2024-09-17 PROCEDURE — 99214 OFFICE O/P EST MOD 30 MIN: CPT | Performed by: NURSE PRACTITIONER

## 2024-09-17 PROCEDURE — 1159F MED LIST DOCD IN RCRD: CPT | Performed by: NURSE PRACTITIONER

## 2024-09-17 PROCEDURE — 1160F RVW MEDS BY RX/DR IN RCRD: CPT | Performed by: NURSE PRACTITIONER

## 2024-09-24 ENCOUNTER — TELEPHONE (OUTPATIENT)
Dept: GASTROENTEROLOGY | Facility: CLINIC | Age: 54
End: 2024-09-24
Payer: MEDICARE

## 2024-09-24 DIAGNOSIS — K59.03 DRUG-INDUCED CONSTIPATION: Primary | ICD-10-CM

## 2024-09-24 RX ORDER — NALDEMEDINE 0.2 MG/1
0.2 TABLET ORAL DAILY
Qty: 30 TABLET | Refills: 11 | Status: SHIPPED | OUTPATIENT
Start: 2024-09-24

## 2024-12-17 ENCOUNTER — OFFICE VISIT (OUTPATIENT)
Dept: GASTROENTEROLOGY | Facility: CLINIC | Age: 54
End: 2024-12-17
Payer: MEDICARE

## 2024-12-17 VITALS
SYSTOLIC BLOOD PRESSURE: 144 MMHG | HEIGHT: 72 IN | WEIGHT: 235 LBS | DIASTOLIC BLOOD PRESSURE: 78 MMHG | OXYGEN SATURATION: 98 % | HEART RATE: 96 BPM | BODY MASS INDEX: 31.83 KG/M2

## 2024-12-17 DIAGNOSIS — K59.03 DRUG-INDUCED CONSTIPATION: ICD-10-CM

## 2024-12-17 DIAGNOSIS — K21.9 GASTROESOPHAGEAL REFLUX DISEASE, UNSPECIFIED WHETHER ESOPHAGITIS PRESENT: Primary | ICD-10-CM

## 2024-12-17 DIAGNOSIS — Z86.0100 HISTORY OF COLON POLYPS: ICD-10-CM

## 2024-12-17 PROCEDURE — 1159F MED LIST DOCD IN RCRD: CPT | Performed by: NURSE PRACTITIONER

## 2024-12-17 PROCEDURE — 1160F RVW MEDS BY RX/DR IN RCRD: CPT | Performed by: NURSE PRACTITIONER

## 2024-12-17 PROCEDURE — 99214 OFFICE O/P EST MOD 30 MIN: CPT | Performed by: NURSE PRACTITIONER

## 2024-12-17 RX ORDER — LINACLOTIDE 290 UG/1
290 CAPSULE, GELATIN COATED ORAL
COMMUNITY
Start: 2024-12-15

## 2024-12-17 NOTE — PATIENT INSTRUCTIONS
Try again to get RELISTOR from the pharmacy for your constipation     Give me update in 2-3 weeks (MYCHART or call office)

## 2024-12-17 NOTE — PROGRESS NOTES
"Chief Complaint   Constipation and Heartburn    History of Present Illness       Francisco Colin is a 54 y.o. male who presents to [facility]STR[facility]OLOG[facility] for follow-up for constipation.  He was last seen in the office by me on 9/17/2024.    He does have a history of chronic constipation--- Linzess 290----on chronic pain medication with dilaudid pain pump. Denies any rectal bleeding or melena. Reports the LINZESS is not working. He will have a BM every 4-5 days. Used to be up to 9 days without a BM. Has tried everything OTC without relief.      GERD--- does well with Protonix 40 mg daily. Denies any breakthrough reflux or dysphagia.      He underwent colonoscopy with Dr. Barclay for personal history of colon polyps on 5/14/2024.  Colonoscopy showed preparation of the colon was inadequate.  Hemorrhoids found on perianal exam.  The entire examined colon is normal.  No specimens collected.  Repeat colonoscopy in 1 year for surveillance due to quality of bowel prep.     GI family history----Maternal aunt with pancreatic cancer.      Tried movantik and symproic. Nothing has been working well.  Linzess quit working.  Using lactulose with OJ but that works but causes terrible gi upset and nausea.   Results       Result Review :                   Lipase No results found for: \"LIPASE\"  Amylase No results found for: \"AMYLASE\"  Iron Profile No results found for: \"IRON\", \"TIBC\", \"LABIRON\", \"TRANSFERRIN\"  Ferritin No results found for: \"FERRITIN\"  ESR (Sed Rate) No results found for: \"SEDRATE\"  CRP (C-Reactive) No results found for: \"CRP\"            Past Medical History       Past Medical History:   Diagnosis Date    Arthritis     Asthma     Depression     Diabetes mellitus     Disease of thyroid gland     GERD (gastroesophageal reflux disease)     Hypertension     Injury of back     Migraine        Past Surgical History:   Procedure Laterality Date    CARPAL TUNNEL RELEASE      COLONOSCOPY      COLONOSCOPY " N/A 5/14/2024    Procedure: COLONOSCOPY;  Surgeon: Rosetta Barclay MD;  Location: [facility] ENDOSCOPY;  Service: Gastroenterology;  Laterality: N/A;  Hemorrhoids    ENDOSCOPY      PAIN PUMP INSERTION/REVISION      ULNAR NERVE REPAIR      UPPER GASTROINTESTINAL ENDOSCOPY           Current Outpatient Medications:     albuterol sulfate HFA (Ventolin HFA) 108 (90 Base) MCG/ACT inhaler, Every 4 (Four) Hours., Disp: , Rfl:     Alcohol Swabs (Alcohol Prep) 70 % pads, as directed, Disp: , Rfl:     aspirin 81 MG chewable tablet, aspirin 81 mg oral tablet,chewable chew 1 tablet (81 mg) by oral route once daily   Active, Disp: , Rfl:     atorvastatin (LIPITOR) 80 MG tablet, Daily., Disp: , Rfl:     busPIRone (BUSPAR) 15 MG tablet, buspirone 15 mg oral tablet take 1 tablet (15 mg) by oral route 2 times per day   Active, Disp: , Rfl:     Cetirizine HCl (ZyrTEC Allergy) 10 MG capsule, as directed, Disp: , Rfl:     diclofenac (CATAFLAM) 50 MG tablet, , Disp: , Rfl:     Diclofenac Sodium (VOLTAREN) 1 % gel gel, 4 g., Disp: , Rfl:     dicyclomine (BENTYL) 10 MG capsule, dicyclomine 10 mg oral capsule take 1 capsule (10 mg) by oral route 3 times per day   Active, Disp: , Rfl:     ergocalciferol (ERGOCALCIFEROL) 1.25 MG (15000 UT) capsule, , Disp: , Rfl:     escitalopram (LEXAPRO) 20 MG tablet, Take 1 tablet by mouth Daily., Disp: , Rfl:     fluticasone (FLONASE) 50 MCG/ACT nasal spray, Daily., Disp: , Rfl:     fluticasone-salmeterol (Advair Diskus) 250-50 MCG/DOSE DISKUS, , Disp: , Rfl:     glimepiride (AMARYL) 4 MG tablet, Daily., Disp: , Rfl:     Glucose Blood (BLOOD GLUCOSE TEST VI), Every 12 (Twelve) Hours., Disp: , Rfl:     glucose monitor monitoring kit, as directed, Disp: , Rfl:     HYDROmorphone (Dilaudid) 2 MG/ML injection, Every 4 (Four) Hours., Disp: , Rfl:     HYDROmorphone HCl (DILAUDID-HP IJ), , Disp: , Rfl:     lactulose (CHRONULAC) 10 GM/15ML solution, Take 30 mL by mouth 2 (Two) Times a Day., Disp: 1800 mL, Rfl:  3    Lancets (freestyle) lancets, Every 12 (Twelve) Hours., Disp: , Rfl:     Linzess 290 MCG capsule capsule, Take 1 capsule by mouth., Disp: , Rfl:     Melatonin 5 MG/15ML liquid, 2 (two) times a day., Disp: , Rfl:     Methylnaltrexone Bromide (Relistor) 150 MG tablet, Take 3 tablets by mouth Daily., Disp: 90 tablet, Rfl: 3    Naldemedine Tosylate (Symproic) 0.2 MG tablet, Take 0.2 mg by mouth Daily., Disp: 30 tablet, Rfl: 11    pantoprazole (PROTONIX) 40 MG EC tablet, pantoprazole 40 mg oral tablet,delayed release (DR/EC) take 1 tablet (40 mg) by oral route once daily   Suspended, Disp: , Rfl:     SITagliptin-metFORMIN HCl ER (JANUMET XR)  MG tablet, Daily., Disp: , Rfl:     telmisartan-hydrochlorothiazide (MICARDIS HCT) 80-12.5 MG per tablet, Daily., Disp: , Rfl:     Testosterone Cypionate 200 MG/ML solution, 1 ml, Disp: , Rfl:     Thyroid (ARMOUR THYROID) 30 MG PO tablet, Daily., Disp: , Rfl:     TiZANidine (ZANAFLEX) 2 MG capsule, Every 8 (Eight) Hours., Disp: , Rfl:     TRAZODONE 100MG/5ML LIQUID, , Disp: , Rfl:     valACYclovir (VALTREX) 500 MG tablet, valacyclovir 500 mg oral tablet take 1 tablet (500 mg) by oral route once daily   Active, Disp: , Rfl:     venlafaxine XR (EFFEXOR-XR) 150 MG 24 hr capsule, venlafaxine 150 mg oral capsule,extended release 24hr take 1 capsule (150 mg) by oral route once daily   Active, Disp: , Rfl:      Allergies   Allergen Reactions    Morphine Unknown - Low Severity       Family History   Problem Relation Age of Onset    Skin cancer Father     Stroke Father     Heart failure Father     Diabetes Father         Social History     Social History Narrative    Not on file       Objective       Review of Systems   Constitutional:  Negative for appetite change, fatigue, fever, unexpected weight gain and unexpected weight loss.   HENT:  Negative for trouble swallowing.    Respiratory:  Negative for cough, choking, chest tightness, shortness of breath, wheezing and stridor.   "  Cardiovascular:  Negative for chest pain, palpitations and leg swelling.   Gastrointestinal:  Positive for abdominal distention, abdominal pain, constipation, nausea and indigestion. Negative for anal bleeding, blood in stool, diarrhea, rectal pain, vomiting and GERD.        Vital Signs:   /78 (BP Location: Left arm, Patient Position: Sitting)   Pulse 96   Ht 182.9 cm (72\")   Wt 107 kg (235 lb)   SpO2 98%   BMI 31.87 kg/m²       Physical Exam  Constitutional:       General: He is not in acute distress.     Appearance: He is well-developed. He is not ill-appearing.   HENT:      Head: Normocephalic.   Eyes:      Pupils: Pupils are equal, round, and reactive to light.   Cardiovascular:      Rate and Rhythm: Normal rate and regular rhythm.      Heart sounds: Normal heart sounds.   Pulmonary:      Effort: Pulmonary effort is normal.      Breath sounds: Normal breath sounds.   Abdominal:      General: Bowel sounds are normal. There is distension.      Palpations: Abdomen is soft. There is no mass.      Tenderness: There is no abdominal tenderness. There is no guarding or rebound.      Hernia: No hernia is present.   Musculoskeletal:         General: Normal range of motion.   Skin:     General: Skin is warm and dry.   Neurological:      Mental Status: He is alert and oriented to person, place, and time.   Psychiatric:         Speech: Speech normal.         Behavior: Behavior normal.         Judgment: Judgment normal.           Assessment & Plan          Assessment and Plan    Diagnoses and all orders for this visit:    1. Gastroesophageal reflux disease, unspecified whether esophagitis present (Primary)    2. Drug-induced constipation  -     Methylnaltrexone Bromide (Relistor) 150 MG tablet; Take 3 tablets by mouth Daily.  Dispense: 90 tablet; Refill: 3    3. History of colon polyps      Constipation is still not well-controlled despite trying Symproic and Movantik.  Will try again for the relistor.  Hopefully " insurance will approve at this time.  Continue lactulose for now.  Unfortunately he cannot do the lactulose every day because it makes him so sick to his stomach.  GERD is well-controlled on Protonix.  Continue GERD precautions.  Patient to give me an update once he starts on the relistor.  Patient to follow-up with me in 3 months.  At that time we will schedule his screening colonoscopy for next spring.  Patient is agreeable to the plan.          Follow Up       Follow Up   Return in about 3 months (around 3/17/2025) for CONSTIPATION.  Patient was given instructions and counseling regarding his condition or for health maintenance advice. Please see specific information pulled into the AVS if appropriate.

## 2025-02-26 ENCOUNTER — TELEPHONE (OUTPATIENT)
Dept: GASTROENTEROLOGY | Facility: CLINIC | Age: 55
End: 2025-02-26
Payer: MEDICARE

## 2025-02-26 NOTE — TELEPHONE ENCOUNTER
Patient's spouse called to let the provider know that she canceled his appointment in March because the medication (Symproic) is not working and the relistor was denied. Patients spouse stated that there is no reason for the appointment then.

## 2025-03-03 NOTE — TELEPHONE ENCOUNTER
Pt is still taking lactulose. Pt states he bounces back between lactulose and movantik. He said the lactulose makes him nausea a lot of times. He said he is not having  much success with either medications. He said last week he used a bottle of mag citrate. It did not help either. Please advise.

## 2025-03-28 ENCOUNTER — TELEPHONE (OUTPATIENT)
Dept: GASTROENTEROLOGY | Facility: CLINIC | Age: 55
End: 2025-03-28
Payer: MEDICARE

## 2025-03-28 NOTE — TELEPHONE ENCOUNTER
Contacted patient about the referral from his PCP about a colonoscopy screening, and per Dr Barclay's notes she recommended a one year repeat. Patient is not having any new symptoms, scheduled for a day that worked best for patient 04/28/2025 at 11:00 am for the phone appointment.

## 2025-04-28 ENCOUNTER — PREP FOR SURGERY (OUTPATIENT)
Dept: OTHER | Facility: HOSPITAL | Age: 55
End: 2025-04-28
Payer: OTHER GOVERNMENT

## 2025-04-28 ENCOUNTER — CLINICAL SUPPORT (OUTPATIENT)
Dept: GASTROENTEROLOGY | Facility: CLINIC | Age: 55
End: 2025-04-28
Payer: MEDICARE

## 2025-04-28 ENCOUNTER — TELEPHONE (OUTPATIENT)
Dept: GASTROENTEROLOGY | Facility: CLINIC | Age: 55
End: 2025-04-28
Payer: OTHER GOVERNMENT

## 2025-04-28 DIAGNOSIS — Z12.11 SCREENING FOR MALIGNANT NEOPLASM OF COLON: ICD-10-CM

## 2025-04-28 DIAGNOSIS — Z86.0100 HISTORY OF COLON POLYPS: Primary | ICD-10-CM

## 2025-04-28 RX ORDER — PEG-3350, SODIUM SULFATE, SODIUM CHLORIDE, POTASSIUM CHLORIDE, SODIUM ASCORBATE AND ASCORBIC ACID 7.5-2.691G
KIT ORAL
Qty: 1000 ML | Refills: 0 | Status: SHIPPED | OUTPATIENT
Start: 2025-04-28

## 2025-04-28 RX ORDER — LUBIPROSTONE 24 UG/1
24 CAPSULE ORAL 2 TIMES DAILY WITH MEALS
COMMUNITY

## 2025-04-28 NOTE — PROGRESS NOTES
Francisco Colin  1970  54 y.o.    Reason for call: 1 year recall  If recall, please list diagnosis: surveillance due to quality of bowel prep  Prep prescribed: Moviprep  Prep instructions reviewed with patient and sent to patient via regular mail to the home address on file  Is the patient currently on any injectable or oral medications for weight loss or diabetes? No  Clearance needed? Yes  If yes, what clearance is needed? Cardiology  Clearance has been requested from Dr Frankie Rogers  The patient has been scheduled for: Colonoscopy    If scheduled for screening colonoscopy Francisco Colin is aware they have been scheduled for a screening colonoscopy. Patient has expressed they are not having any symptoms at all.     After your procedure, you will be contacted with results. Please confirm the best phone # to reach the patient: [phone]  Family history of colon cancer? No  If yes, indicate relative: N/A  Tentative Procedure Date: 06/12/2025    Date/Place of last Scope: [facility]: 05/14/2024  Able to obtain report? yes    Family History   Problem Relation Age of Onset    Skin cancer Father     Stroke Father     Heart failure Father     Diabetes Father     Colon cancer Neg Hx      Past Medical History:   Diagnosis Date    Arthritis     Asthma     Depression     Diabetes mellitus     Disease of thyroid gland     GERD (gastroesophageal reflux disease)     Hypertension     Injury of back     Migraine      Allergies   Allergen Reactions    Morphine Unknown - Low Severity     Past Surgical History:   Procedure Laterality Date    CARPAL TUNNEL RELEASE      COLONOSCOPY      COLONOSCOPY N/A 5/14/2024    Procedure: COLONOSCOPY;  Surgeon: Rosetta Barclay MD;  Location: [facility] ENDOSCOPY;  Service: Gastroenterology;  Laterality: N/A;  Hemorrhoids    ENDOSCOPY      PAIN PUMP INSERTION/REVISION      ULNAR NERVE REPAIR      UPPER GASTROINTESTINAL ENDOSCOPY       Social History     Socioeconomic History    Marital status:     Tobacco Use    Smoking status: Never    Smokeless tobacco: Never   Vaping Use    Vaping status: Never Used   Substance and Sexual Activity    Alcohol use: Never    Drug use: Never    Sexual activity: Defer       Current Outpatient Medications:     albuterol sulfate HFA (Ventolin HFA) 108 (90 Base) MCG/ACT inhaler, Every 4 (Four) Hours., Disp: , Rfl:     Alcohol Swabs (Alcohol Prep) 70 % pads, as directed, Disp: , Rfl:     aspirin 81 MG chewable tablet, aspirin 81 mg oral tablet,chewable chew 1 tablet (81 mg) by oral route once daily   Active, Disp: , Rfl:     atorvastatin (LIPITOR) 80 MG tablet, Daily., Disp: , Rfl:     busPIRone (BUSPAR) 15 MG tablet, buspirone 15 mg oral tablet take 1 tablet (15 mg) by oral route 2 times per day   Active, Disp: , Rfl:     Cetirizine HCl (ZyrTEC Allergy) 10 MG capsule, as directed, Disp: , Rfl:     diclofenac (CATAFLAM) 50 MG tablet, , Disp: , Rfl:     Diclofenac Sodium (VOLTAREN) 1 % gel gel, 4 g., Disp: , Rfl:     dicyclomine (BENTYL) 10 MG capsule, dicyclomine 10 mg oral capsule take 1 capsule (10 mg) by oral route 3 times per day   Active, Disp: , Rfl:     ergocalciferol (ERGOCALCIFEROL) 1.25 MG (56467 UT) capsule, , Disp: , Rfl:     escitalopram (LEXAPRO) 20 MG tablet, Take 1 tablet by mouth Daily., Disp: , Rfl:     fluticasone (FLONASE) 50 MCG/ACT nasal spray, Daily., Disp: , Rfl:     fluticasone-salmeterol (Advair Diskus) 250-50 MCG/DOSE DISKUS, , Disp: , Rfl:     glimepiride (AMARYL) 4 MG tablet, Daily., Disp: , Rfl:     Glucose Blood (BLOOD GLUCOSE TEST VI), Every 12 (Twelve) Hours., Disp: , Rfl:     glucose monitor monitoring kit, as directed, Disp: , Rfl:     HYDROmorphone (Dilaudid) 2 MG/ML injection, Every 4 (Four) Hours., Disp: , Rfl:     HYDROmorphone HCl (DILAUDID-HP IJ), , Disp: , Rfl:     lactulose (CHRONULAC) 10 GM/15ML solution, Take 30 mL by mouth 2 (Two) Times a Day., Disp: 1800 mL, Rfl: 3    Lancets (freestyle) lancets, Every 12 (Twelve) Hours.,  Disp: , Rfl:     Linzess 290 MCG capsule capsule, Take 1 capsule by mouth., Disp: , Rfl:     lubiprostone (AMITIZA) 24 MCG capsule, Take 1 capsule by mouth 2 (Two) Times a Day With Meals., Disp: , Rfl:     Melatonin 5 MG/15ML liquid, 2 (two) times a day., Disp: , Rfl:     Methylnaltrexone Bromide (Relistor) 150 MG tablet, Take 3 tablets by mouth Daily., Disp: 90 tablet, Rfl: 3    Naldemedine Tosylate (Symproic) 0.2 MG tablet, Take 0.2 mg by mouth Daily., Disp: 30 tablet, Rfl: 11    pantoprazole (PROTONIX) 40 MG EC tablet, pantoprazole 40 mg oral tablet,delayed release (DR/EC) take 1 tablet (40 mg) by oral route once daily   Suspended, Disp: , Rfl:     SITagliptin-metFORMIN HCl ER (JANUMET XR)  MG tablet, Daily., Disp: , Rfl:     telmisartan-hydrochlorothiazide (MICARDIS HCT) 80-12.5 MG per tablet, Daily., Disp: , Rfl:     Testosterone Cypionate 200 MG/ML solution, 1 ml, Disp: , Rfl:     Thyroid (ARMOUR THYROID) 30 MG PO tablet, Daily., Disp: , Rfl:     TiZANidine (ZANAFLEX) 2 MG capsule, Every 8 (Eight) Hours., Disp: , Rfl:     TRAZODONE 100MG/5ML LIQUID, , Disp: , Rfl:     valACYclovir (VALTREX) 500 MG tablet, valacyclovir 500 mg oral tablet take 1 tablet (500 mg) by oral route once daily   Active, Disp: , Rfl:     venlafaxine XR (EFFEXOR-XR) 150 MG 24 hr capsule, venlafaxine 150 mg oral capsule,extended release 24hr take 1 capsule (150 mg) by oral route once daily   Active, Disp: , Rfl:

## 2025-04-28 NOTE — TELEPHONE ENCOUNTER
2025    Dear Dr. Rogers,      Patient Name: Francisco Colin  : 1970      This patient is waiting to have a Colonoscopy which I will perform at [facility] on ___2025______. Please respond to this request noting your recommendations regarding clearance from a Cardiac  standpoint.  You may contact our office at [phone] option 3 with any questions. I appreciate your prompt response in this matter. Please return this form to our office as soon as possible to Colleen.    ____ I approve my patient from a Cardiac  standpoint    ____ I do NOT approve my patient from a Cardiac  standpoint at this time    Please inform our office if the patient requires additional follow-up from your office prior to scheduled procedure date.      Please specify clearance expiration date:____________________________________      Approving physician name (please print): _____________________________________________      Approving physician signature: ________________________________ Date:________________  Sincerely,  [facility] - Gastroenterology   Dr. Rosetta Barclay          Please fax approval or denial to our office as soon as possible. jose

## 2025-05-01 ENCOUNTER — TELEPHONE (OUTPATIENT)
Dept: GASTROENTEROLOGY | Facility: CLINIC | Age: 55
End: 2025-05-01
Payer: OTHER GOVERNMENT

## 2025-05-01 ENCOUNTER — OFFICE VISIT (OUTPATIENT)
Dept: CARDIOLOGY | Facility: CLINIC | Age: 55
End: 2025-05-01
Payer: MEDICARE

## 2025-05-01 VITALS
SYSTOLIC BLOOD PRESSURE: 146 MMHG | BODY MASS INDEX: 33.4 KG/M2 | HEART RATE: 79 BPM | DIASTOLIC BLOOD PRESSURE: 86 MMHG | WEIGHT: 246.6 LBS | HEIGHT: 72 IN

## 2025-05-01 DIAGNOSIS — R94.31 ABNORMAL EKG: ICD-10-CM

## 2025-05-01 DIAGNOSIS — E78.2 HYPERLIPEMIA, MIXED: ICD-10-CM

## 2025-05-01 DIAGNOSIS — I10 HYPERTENSION, ESSENTIAL: Primary | ICD-10-CM

## 2025-05-01 PROCEDURE — 1160F RVW MEDS BY RX/DR IN RCRD: CPT | Performed by: SPECIALIST

## 2025-05-01 PROCEDURE — 93000 ELECTROCARDIOGRAM COMPLETE: CPT | Performed by: SPECIALIST

## 2025-05-01 PROCEDURE — 1159F MED LIST DOCD IN RCRD: CPT | Performed by: SPECIALIST

## 2025-05-01 PROCEDURE — 99204 OFFICE O/P NEW MOD 45 MIN: CPT | Performed by: SPECIALIST

## 2025-05-01 NOTE — TELEPHONE ENCOUNTER
ENDO RECONCILIATION  Verify source of procedure(s): Nurse/MA screening  If other, please list source:     TIME OUT-CONFIRM CORRECT PROCEDURE: Colonoscopy  Cardiology: Ken  Pulmonology:  Blood thinner:   GLP-1:  Additional DX/indication for procedure:    Please include any other notes relevant to endo reconciliation:     Cardiac clearance pending from Dr Rogers.

## 2025-05-01 NOTE — PROGRESS NOTES
[facility]   Cardiology Consult Note    Patient Name: Francisco Colin  : 1970  Referring Physician: Self Referring  Subjective   Subjective     Reason for Consult/ Chief Complaint:   Chief Complaint   Patient presents with    Establish Care     Surgical clearance   Abnormal EKG    HPI:  Francisco Colin is a 54 y.o. male with history of hypertension, referred for preop restratification prior to surgery.  Denies any chest pain.  Previous cardiac catheter showed no significant coronary artery disease.    Review of Systems:    Constitutional no fever,  no weight loss   Skin no rash   Otolaryngeal no difficulty swallowing   Cardiovascular See HPI   Pulmonary no cough, no sputum production   Gastrointestinal no constipation, no diarrhea   Genitourinary no dysuria, no hematuria   Hematologic no easy bruisability, no abnormal bleeding   Musculoskeletal no muscle pain   Neurologic no dizziness, no falls       Personal History     Past Medical History:  Past Medical History:   Diagnosis Date    Arthritis     Asthma     Depression     Diabetes mellitus     Disease of thyroid gland     GERD (gastroesophageal reflux disease)     Hypertension     Injury of back     Migraine        Family History:   Family History   Problem Relation Age of Onset    Skin cancer Father     Stroke Father     Heart failure Father     Diabetes Father     Colon cancer Neg Hx        Social History:  reports that he has never smoked. He has never used smokeless tobacco. He reports that he does not drink alcohol and does not use drugs.    Home Medications:  Alcohol Prep, Cetirizine HCl, Diclofenac Sodium, Glucose Blood, HYDROmorphone, HYDROmorphone HCl, Melatonin, Methylnaltrexone Bromide, Naldemedine Tosylate, PEG-KCl-NaCl-NaSulf-Na Asc-C, SITaglip Phos-metFORMIN HCl ER, Testosterone Cypionate, Thyroid, TiZANidine, albuterol sulfate HFA, aspirin, atorvastatin, busPIRone, diclofenac, dicyclomine, ergocalciferol, escitalopram, fluticasone,  fluticasone-salmeterol, freestyle, glimepiride, glucose monitor, lactulose, linaclotide, lubiprostone, pantoprazole, telmisartan-hydrochlorothiazide, traZODone, valACYclovir, and venlafaxine XR    Allergies:  Allergies   Allergen Reactions    Morphine Unknown - Low Severity       Objective    Objective     Vitals:   Heart Rate:  [79] 79  BP: (146)/(86) 146/86  Body mass index is 33.44 kg/m².  PHYSICAL EXAM:    General Appearance:   well developed  well nourished  HENT:   oropharynx moist  lips not cyanotic  Neck:  thyroid not enlarged  supple  Respiratory:  no respiratory distress  normal breath sounds  no rales  Cardiovascular:  no jugular venous distention  regular rhythm  apical impulse normal  S1 normal, S2 normal  no S3, no S4   no murmur  no rub, no thrill  carotid pulses normal; no bruit  pedal pulses normal  lower extremity edema: none    Skin:   warm, dry  Psychiatric:  judgement and insight appropriate  normal mood and affect    RESULTS:           Result Review    Result Review:  I have personally reviewed the available results:  [x]  Laboratory  [x]  EKG/Telemetry   [x]  Cardiology/Vascular   [x] Medications  [x]  Old records        ECG 12 Lead    Date/Time: 5/1/2025 12:16 PM  Performed by: Frankie Rogers MD    Authorized by: Frankie Rogers MD  Rhythm: sinus rhythm  Rate: normal  Conduction: non-specific intraventricular conduction delay  QRS axis: normal  Other findings: non-specific ST-T wave changes    Clinical impression: abnormal EKG  Comments: Normal sinus rhythm.  Cannot exclude old inferior myocardial infarction and anterior myocardial infarction.  Intraventricular conduction delay.           Impression/Plan  1. Abnormal EKG with risk factors: Sestamibi stress test to evaluate for any significant ischemia.  Echocardiogram.  Continue aspirin 81 mg once a day.  2.  Mixed hyperlipidemia: Continue Lipitor 80 mg once a day.  Monitor.  Hepatic profile.  3.  Essential hypertension controlled:  Continue Micardis 80/12.5 mg once a day.  Monitor blood pressure regularly.      Electronically signed by Frankie Rogers MD, 05/01/25, 12:09 PM EDT.

## 2025-05-23 NOTE — NURSING NOTE
Spoke with patient, reviewed home medications and allergies, advised no eating or drinking after midnight prior to stress test , no coffee or tea (decaf or regular), no soda (decaf or regular), no energy drinks or herbal teas, and no medication containing caffeine products after 6AM Tuesday. Please arrive 15 minutes early, bring any medications you would take in the morning with you and you can take them after your test,we are located in the [facility], and plan to be here for approximately 3-4 hours.

## 2025-05-27 ENCOUNTER — HOSPITAL ENCOUNTER (OUTPATIENT)
Facility: HOSPITAL | Age: 55
Discharge: HOME | End: 2025-05-27
Payer: MEDICARE

## 2025-05-27 ENCOUNTER — RESULTS FOLLOW-UP (OUTPATIENT)
Dept: CARDIOLOGY | Facility: CLINIC | Age: 55
End: 2025-05-27

## 2025-05-27 DIAGNOSIS — R94.31 ABNORMAL EKG: ICD-10-CM

## 2025-05-27 LAB
AORTIC DIMENSIONLESS INDEX: 0.62 (DI)
ASCENDING AORTA: 3.1 CM
AV MEAN PRESS GRAD SYS DOP V1V2: 7.1 MMHG
AV VMAX SYS DOP: 156.8 CM/SEC
BH CV ECHO MEAS - ACS: 1.37 CM
BH CV ECHO MEAS - AO MAX PG: 9.8 MMHG
BH CV ECHO MEAS - AO ROOT DIAM: 3 CM
BH CV ECHO MEAS - AO V2 VTI: 31 CM
BH CV ECHO MEAS - AVA(I,D): 2.14 CM2
BH CV ECHO MEAS - EDV(MOD-SP2): 139.1 ML
BH CV ECHO MEAS - EDV(MOD-SP4): 128 ML
BH CV ECHO MEAS - EF(MOD-SP2): 63.8 %
BH CV ECHO MEAS - EF(MOD-SP4): 62.3 %
BH CV ECHO MEAS - ESV(MOD-SP2): 50.4 ML
BH CV ECHO MEAS - ESV(MOD-SP4): 48.2 ML
BH CV ECHO MEAS - FS: 36 %
BH CV ECHO MEAS - IVS/LVPW: 1.18 CM
BH CV ECHO MEAS - IVSD: 1.3 CM
BH CV ECHO MEAS - LA DIMENSION: 3.9 CM
BH CV ECHO MEAS - LAT PEAK E' VEL: 15.5 CM/SEC
BH CV ECHO MEAS - LV DIASTOLIC VOL/BSA (35-75): 55 CM2
BH CV ECHO MEAS - LV MAX PG: 5.8 MMHG
BH CV ECHO MEAS - LV MEAN PG: 3.1 MMHG
BH CV ECHO MEAS - LV SYSTOLIC VOL/BSA (12-30): 20.7 CM2
BH CV ECHO MEAS - LV V1 MAX: 120 CM/SEC
BH CV ECHO MEAS - LV V1 VTI: 19.2 CM
BH CV ECHO MEAS - LVIDD: 4.5 CM
BH CV ECHO MEAS - LVIDS: 2.9 CM
BH CV ECHO MEAS - LVOT AREA: 3.5 CM2
BH CV ECHO MEAS - LVOT DIAM: 2.1 CM
BH CV ECHO MEAS - LVPWD: 1.1 CM
BH CV ECHO MEAS - MED PEAK E' VEL: 7.8 CM/SEC
BH CV ECHO MEAS - MV A MAX VEL: 76.2 CM/SEC
BH CV ECHO MEAS - MV DEC SLOPE: 343 CM/SEC2
BH CV ECHO MEAS - MV DEC TIME: 184 SEC
BH CV ECHO MEAS - MV E MAX VEL: 63.6 CM/SEC
BH CV ECHO MEAS - MV E/A: 0.83
BH CV ECHO MEAS - MV MAX PG: 3.4 MMHG
BH CV ECHO MEAS - MV MEAN PG: 1.5 MMHG
BH CV ECHO MEAS - MV P1/2T: 65 MSEC
BH CV ECHO MEAS - MV V2 VTI: 22.4 CM
BH CV ECHO MEAS - MVA(P1/2T): 3.4 CM2
BH CV ECHO MEAS - MVA(VTI): 3 CM2
BH CV ECHO MEAS - PA ACC TIME: 0.05 SEC
BH CV ECHO MEAS - PA V2 MAX: 150 CM/SEC
BH CV ECHO MEAS - RVDD: 4.1 CM
BH CV ECHO MEAS - SV(LVOT): 66.4 ML
BH CV ECHO MEAS - SV(MOD-SP2): 88.7 ML
BH CV ECHO MEAS - SV(MOD-SP4): 79.8 ML
BH CV ECHO MEAS - SVI(LVOT): 28.5 ML/M2
BH CV ECHO MEAS - SVI(MOD-SP2): 38.1 ML/M2
BH CV ECHO MEAS - SVI(MOD-SP4): 34.3 ML/M2
BH CV ECHO MEAS - TAPSE (>1.6): 2.07 CM
BH CV ECHO MEASUREMENTS AVERAGE E/E' RATIO: 5.46
BH CV IMMEDIATE POST RECOVERY TECH DATA SYMPTOMS: NORMAL
BH CV IMMEDIATE POST TECH DATA BLOOD PRESSURE: NORMAL MMHG
BH CV IMMEDIATE POST TECH DATA HEART RATE: 96 BPM
BH CV IMMEDIATE POST TECH DATA OXYGEN SATS: 96 %
BH CV REST NUCLEAR ISOTOPE DOSE: 9.3 MCI
BH CV SIX MINUTE RECOVERY TECH DATA BLOOD PRESSURE: NORMAL
BH CV SIX MINUTE RECOVERY TECH DATA HEART RATE: 81 BPM
BH CV SIX MINUTE RECOVERY TECH DATA OXYGEN SATURATION: 94 %
BH CV SIX MINUTE RECOVERY TECH DATA SYMPTOMS: NORMAL
BH CV STRESS BP STAGE 1: NORMAL
BH CV STRESS COMMENTS STAGE 1: NORMAL
BH CV STRESS DOSE REGADENOSON STAGE 1: 0.4
BH CV STRESS DURATION MIN STAGE 1: 0
BH CV STRESS DURATION SEC STAGE 1: 10
BH CV STRESS HR STAGE 1: 80
BH CV STRESS NUCLEAR ISOTOPE DOSE: 35.9 MCI
BH CV STRESS O2 STAGE 1: 92
BH CV STRESS PROTOCOL 1: NORMAL
BH CV STRESS RECOVERY BP: NORMAL MMHG
BH CV STRESS RECOVERY HR: 81 BPM
BH CV STRESS RECOVERY O2: 94 %
BH CV STRESS STAGE 1: 1
BH CV THREE MINUTE POST TECH DATA BLOOD PRESSURE: NORMAL MMHG
BH CV THREE MINUTE POST TECH DATA HEART RATE: 88 BPM
BH CV THREE MINUTE POST TECH DATA OXYGEN SATURATION: 94 %
BH CV THREE MINUTE RECOVERY TECH DATA SYMPTOM: NORMAL
BH CV XLRA - RV BASE: 4.7 CM
BH CV XLRA - RV MID: 3.9 CM
BH CV XLRA - TDI S': 15.1 CM/SEC
IVRT: 50 MS
LEFT ATRIUM VOLUME INDEX: 25.6 ML/M2
LV EF 2D ECHO EST: 60 %
MAXIMAL PREDICTED HEART RATE: 164 BPM
PERCENT MAX PREDICTED HR: 64.02 %
SPECT HRT GATED+EF W RNC IV: 43 %
STRESS BASELINE BP: NORMAL MMHG
STRESS BASELINE HR: 67 BPM
STRESS O2 SAT REST: 93 %
STRESS PERCENT HR: 75 %
STRESS POST O2 SAT PEAK: 97 %
STRESS POST PEAK BP: NORMAL MMHG
STRESS POST PEAK HR: 105 BPM
STRESS TARGET HR: 139 BPM

## 2025-05-27 PROCEDURE — 93306 TTE W/DOPPLER COMPLETE: CPT | Performed by: SPECIALIST

## 2025-05-27 PROCEDURE — 93017 CV STRESS TEST TRACING ONLY: CPT

## 2025-05-27 PROCEDURE — 34310000005 TECHNETIUM TETROFOSMIN KIT: Performed by: SPECIALIST

## 2025-05-27 PROCEDURE — 93018 CV STRESS TEST I&R ONLY: CPT | Performed by: SPECIALIST

## 2025-05-27 PROCEDURE — 93016 CV STRESS TEST SUPVJ ONLY: CPT | Performed by: NURSE PRACTITIONER

## 2025-05-27 PROCEDURE — A9502 TC99M TETROFOSMIN: HCPCS | Performed by: SPECIALIST

## 2025-05-27 PROCEDURE — 78452 HT MUSCLE IMAGE SPECT MULT: CPT | Performed by: SPECIALIST

## 2025-05-27 PROCEDURE — 25010000002 REGADENOSON 0.4 MG/5ML SOLUTION: Performed by: SPECIALIST

## 2025-05-27 PROCEDURE — 93306 TTE W/DOPPLER COMPLETE: CPT

## 2025-05-27 PROCEDURE — 78452 HT MUSCLE IMAGE SPECT MULT: CPT

## 2025-05-27 RX ORDER — REGADENOSON 0.08 MG/ML
0.4 INJECTION, SOLUTION INTRAVENOUS
Status: COMPLETED | OUTPATIENT
Start: 2025-05-27 | End: 2025-05-27

## 2025-05-27 RX ADMIN — TETROFOSMIN 1 DOSE: 1.38 INJECTION, POWDER, LYOPHILIZED, FOR SOLUTION INTRAVENOUS at 11:54

## 2025-05-27 RX ADMIN — REGADENOSON 0.4 MG: 0.08 INJECTION, SOLUTION INTRAVENOUS at 12:38

## 2025-05-27 RX ADMIN — TETROFOSMIN 1 DOSE: 1.38 INJECTION, POWDER, LYOPHILIZED, FOR SOLUTION INTRAVENOUS at 12:38

## 2025-06-03 NOTE — PAT
Reviewed the following with patient's wife.     Arrival time of 0700.    Must have  over 18 for transportation home post procedure. Come to [facility], entrance [address].     Education provided on laxative administration; bowel prep to be taken in two doses. Reviewed diet instructions for day prior to procedure. Only plain, unflavored water after midnight until two hours prior to arrival time.     Do not take any morning medications on the day of the procedure. Instead bring all prescribed medication and inhalers to the hospital the morning of the procedure. May take any nebulizer treatments or inhalers the morning of the procedure.     Plan to be here 3-4 hours.   Do not bring any valuables to hospital with you. Call Endo department for any questions.     Pt verbalized understanding of instructions.

## 2025-06-11 ENCOUNTER — ANESTHESIA EVENT (OUTPATIENT)
Dept: GASTROENTEROLOGY | Facility: HOSPITAL | Age: 55
End: 2025-06-11
Payer: MEDICARE

## 2025-06-11 RX ORDER — SODIUM CHLORIDE, SODIUM LACTATE, POTASSIUM CHLORIDE, CALCIUM CHLORIDE 600; 310; 30; 20 MG/100ML; MG/100ML; MG/100ML; MG/100ML
30 INJECTION, SOLUTION INTRAVENOUS CONTINUOUS
Status: CANCELLED | OUTPATIENT
Start: 2025-06-12 | End: 2025-06-12

## 2025-06-11 NOTE — ANESTHESIA PREPROCEDURE EVALUATION
Anesthesia Evaluation     Patient summary reviewed and Nursing notes reviewed   NPO Solid Status: > 8 hours  NPO Liquid Status: > 4 hours           Airway   Mallampati: III  TM distance: <3 FB  Neck ROM: limited  Large neck circumference and Possible difficult intubation  Dental    (+) poor dentition        Pulmonary     breath sounds clear to auscultation  (+) asthma (mild intermittent),  Cardiovascular - normal exam  Exercise tolerance: good (4-7 METS)    Rhythm: regular  Rate: normal    (+) hypertension well controlled      Neuro/Psych  (+) headaches, psychiatric history Depression  GI/Hepatic/Renal/Endo    (+) obesity, GERD well controlled, diabetes mellitus type 2 well controlled, thyroid problem     Musculoskeletal     (+) back pain, chronic pain, neck pain  Abdominal   (+) obese    Abdomen: soft.   Substance History      OB/GYN          Other   arthritis,     ROS/Med Hx Other: EKG 5/1/25: Normal sinus rhythm.  Cannot exclude old inferior myocardial infarction and anterior myocardial infarction.  Intraventricular conduction delay.    ECHO 5/27/25:   Left ventricular systolic function is normal. Estimated left ventricular EF = 60%  ·  The left atrial cavity is dilated.  ·  Mild aortic valve stenosis is present.  ·  Estimated right ventricular systolic pressure from tricuspid regurgitation is normal (<35 mmHg).  ·  Trace AI and trace MR.    Stress 5/27/25:  Myocardial perfusion imaging indicates a normal myocardial perfusion study with no evidence of ischemia. Impressions are consistent with a low risk study.    Cardiac clearance received from Dr Rogers on 5/28/25    Last dose Ozempic 06/01    Pain pump with Dilaudid in lower right abdomen for chronic back/neck pain                 Phys Exam Other: Pain pump in RLQ - cont Dilaudid               Anesthesia Plan    ASA 3     general   total IV anesthesia  (Total IV Anesthesia    Patient understands anesthesia not responsible for dental damage.      Discussed  risks with pt including aspiration, allergic reactions, apnea, advanced airway placement. Pt verbalized understanding. All questions answered.     )  intravenous induction     Anesthetic plan, risks, benefits, and alternatives have been provided, discussed and informed consent has been obtained with: patient.  Pre-procedure education provided  Plan discussed with CRNA.      CODE STATUS:

## 2025-06-12 ENCOUNTER — TELEPHONE (OUTPATIENT)
Dept: GASTROENTEROLOGY | Facility: CLINIC | Age: 55
End: 2025-06-12
Payer: MEDICARE

## 2025-06-12 ENCOUNTER — TELEPHONE (OUTPATIENT)
Dept: CARDIOLOGY | Facility: CLINIC | Age: 55
End: 2025-06-12
Payer: MEDICARE

## 2025-06-12 ENCOUNTER — ANESTHESIA (OUTPATIENT)
Dept: GASTROENTEROLOGY | Facility: HOSPITAL | Age: 55
End: 2025-06-12
Payer: MEDICARE

## 2025-06-12 ENCOUNTER — HOSPITAL ENCOUNTER (OUTPATIENT)
Facility: HOSPITAL | Age: 55
Setting detail: HOSPITAL OUTPATIENT SURGERY
Discharge: HOME | End: 2025-06-12
Attending: INTERNAL MEDICINE | Admitting: INTERNAL MEDICINE
Payer: MEDICARE

## 2025-06-12 VITALS
TEMPERATURE: 98 F | RESPIRATION RATE: 12 BRPM | BODY MASS INDEX: 32.62 KG/M2 | DIASTOLIC BLOOD PRESSURE: 86 MMHG | OXYGEN SATURATION: 96 % | HEART RATE: 76 BPM | WEIGHT: 233.03 LBS | HEIGHT: 71 IN | SYSTOLIC BLOOD PRESSURE: 159 MMHG

## 2025-06-12 LAB — GLUCOSE BLDC GLUCOMTR-MCNC: 101 MG/DL (ref 70–99)

## 2025-06-12 PROCEDURE — 82948 REAGENT STRIP/BLOOD GLUCOSE: CPT

## 2025-06-12 PROCEDURE — 25010000002 PROPOFOL 10 MG/ML EMULSION: Performed by: NURSE ANESTHETIST, CERTIFIED REGISTERED

## 2025-06-12 PROCEDURE — 25810000003 LACTATED RINGERS PER 1000 ML

## 2025-06-12 PROCEDURE — G0121 COLON CA SCRN NOT HI RSK IND: HCPCS | Performed by: INTERNAL MEDICINE

## 2025-06-12 PROCEDURE — 25010000002 LIDOCAINE PF 2% 2 % SOLUTION: Performed by: NURSE ANESTHETIST, CERTIFIED REGISTERED

## 2025-06-12 RX ORDER — PROPOFOL 10 MG/ML
VIAL (ML) INTRAVENOUS AS NEEDED
Status: DISCONTINUED | OUTPATIENT
Start: 2025-06-12 | End: 2025-06-12 | Stop reason: SURG

## 2025-06-12 RX ORDER — SODIUM CHLORIDE, SODIUM LACTATE, POTASSIUM CHLORIDE, CALCIUM CHLORIDE 600; 310; 30; 20 MG/100ML; MG/100ML; MG/100ML; MG/100ML
1000 INJECTION, SOLUTION INTRAVENOUS CONTINUOUS
Status: ACTIVE | OUTPATIENT
Start: 2025-06-12 | End: 2025-06-12

## 2025-06-12 RX ORDER — LIDOCAINE HYDROCHLORIDE 20 MG/ML
INJECTION, SOLUTION EPIDURAL; INFILTRATION; INTRACAUDAL; PERINEURAL AS NEEDED
Status: DISCONTINUED | OUTPATIENT
Start: 2025-06-12 | End: 2025-06-12 | Stop reason: SURG

## 2025-06-12 RX ADMIN — PROPOFOL 100 MG: 10 INJECTION, EMULSION INTRAVENOUS at 08:44

## 2025-06-12 RX ADMIN — PROPOFOL 175 MCG/KG/MIN: 10 INJECTION, EMULSION INTRAVENOUS at 08:44

## 2025-06-12 RX ADMIN — SODIUM CHLORIDE, POTASSIUM CHLORIDE, SODIUM LACTATE AND CALCIUM CHLORIDE 1000 ML: 600; 310; 30; 20 INJECTION, SOLUTION INTRAVENOUS at 07:36

## 2025-06-12 RX ADMIN — LIDOCAINE HYDROCHLORIDE 50 MG: 20 INJECTION, SOLUTION EPIDURAL; INFILTRATION; INTRACAUDAL; PERINEURAL at 08:44

## 2025-06-12 NOTE — H&P
Pre Procedure History & Physical    Chief Complaint:   Surveillance colonoscopy    Subjective     HPI:   55 yo M here for surveillance colonoscopy.    Past Medical History:   Past Medical History:   Diagnosis Date    Arthritis     Asthma     Depression     Diabetes mellitus     Disease of thyroid gland     GERD (gastroesophageal reflux disease)     Hypertension     Injury of back     Migraine        Past Surgical History:  Past Surgical History:   Procedure Laterality Date    CARPAL TUNNEL RELEASE      COLONOSCOPY      COLONOSCOPY N/A 5/14/2024    Procedure: COLONOSCOPY;  Surgeon: Rosetta Barclay MD;  Location: [facility] ENDOSCOPY;  Service: Gastroenterology;  Laterality: N/A;  Hemorrhoids    ENDOSCOPY      PAIN PUMP INSERTION/REVISION      ULNAR NERVE REPAIR      UPPER GASTROINTESTINAL ENDOSCOPY         Family History:  Family History   Problem Relation Age of Onset    Skin cancer Father     Stroke Father     Heart failure Father     Diabetes Father     Colon cancer Neg Hx        Social History:   reports that he has never smoked. He has never used smokeless tobacco. He reports that he does not drink alcohol and does not use drugs.    Medications:   Medications Prior to Admission   Medication Sig Dispense Refill Last Dose/Taking    albuterol sulfate HFA (Ventolin HFA) 108 (90 Base) MCG/ACT inhaler Every 4 (Four) Hours.   6/11/2025    aspirin 81 MG chewable tablet aspirin 81 mg oral tablet,chewable chew 1 tablet (81 mg) by oral route once daily   Active   6/11/2025    atorvastatin (LIPITOR) 80 MG tablet Daily.   6/11/2025    busPIRone (BUSPAR) 15 MG tablet buspirone 15 mg oral tablet take 1 tablet (15 mg) by oral route 2 times per day   Active   6/11/2025    Cetirizine HCl (ZyrTEC Allergy) 10 MG capsule as directed   6/11/2025    diclofenac (CATAFLAM) 50 MG tablet    6/11/2025    Diclofenac Sodium (VOLTAREN) 1 % gel gel 4 g.   6/11/2025    dicyclomine (BENTYL) 10 MG capsule dicyclomine 10 mg oral capsule take 1  capsule (10 mg) by oral route 3 times per day   Active   6/11/2025    ergocalciferol (ERGOCALCIFEROL) 1.25 MG (64749 UT) capsule    6/11/2025    escitalopram (LEXAPRO) 20 MG tablet Take 1 tablet by mouth Daily.   6/11/2025    fluticasone (FLONASE) 50 MCG/ACT nasal spray Daily.   6/11/2025    fluticasone-salmeterol (Advair Diskus) 250-50 MCG/DOSE DISKUS    6/11/2025    glimepiride (AMARYL) 4 MG tablet Daily.   6/11/2025    Glucose Blood (BLOOD GLUCOSE TEST VI) Every 12 (Twelve) Hours.   6/12/2025 Morning    HYDROmorphone (Dilaudid) 2 MG/ML injection Every 4 (Four) Hours.   6/12/2025 Morning    HYDROmorphone HCl (DILAUDID-HP IJ)    6/12/2025 Morning    lactulose (CHRONULAC) 10 GM/15ML solution Take 30 mL by mouth 2 (Two) Times a Day. 1800 mL 3 6/11/2025    Linzess 290 MCG capsule capsule Take 1 capsule by mouth.   6/11/2025    lubiprostone (AMITIZA) 24 MCG capsule Take 1 capsule by mouth 2 (Two) Times a Day With Meals.   6/11/2025    Melatonin 5 MG/15ML liquid 2 (two) times a day.   6/11/2025    Methylnaltrexone Bromide (Relistor) 150 MG tablet Take 3 tablets by mouth Daily. 90 tablet 3 6/11/2025    Naldemedine Tosylate (Symproic) 0.2 MG tablet Take 0.2 mg by mouth Daily. 30 tablet 11 6/11/2025    pantoprazole (PROTONIX) 40 MG EC tablet pantoprazole 40 mg oral tablet,delayed release (DR/EC) take 1 tablet (40 mg) by oral route once daily   Suspended   6/11/2025    PEG-KCl-NaCl-NaSulf-Na Asc-C (MoviPrep) 100 g reconstituted solution powder Take as directed by your Gastroenterologist. 1000 mL 0 6/12/2025 Morning    SITagliptin-metFORMIN HCl ER (JANUMET XR)  MG tablet Daily.   6/11/2025    telmisartan-hydrochlorothiazide (MICARDIS HCT) 80-12.5 MG per tablet Daily.   6/11/2025    Testosterone Cypionate 200 MG/ML solution 1 ml   6/11/2025    Thyroid (ARMOUR THYROID) 30 MG PO tablet Daily.   6/11/2025    TiZANidine (ZANAFLEX) 2 MG capsule Every 8 (Eight) Hours.   6/12/2025 Morning    TRAZODONE 100MG/5ML LIQUID     "6/11/2025    valACYclovir (VALTREX) 500 MG tablet valacyclovir 500 mg oral tablet take 1 tablet (500 mg) by oral route once daily   Active   6/11/2025    venlafaxine XR (EFFEXOR-XR) 150 MG 24 hr capsule venlafaxine 150 mg oral capsule,extended release 24hr take 1 capsule (150 mg) by oral route once daily   Active   6/11/2025    Alcohol Swabs (Alcohol Prep) 70 % pads as directed       glucose monitor monitoring kit as directed       Lancets (freestyle) lancets Every 12 (Twelve) Hours.       Semaglutide (OZEMPIC, 1 MG/DOSE, SC) Inject 1 mg under the skin into the appropriate area as directed 1 (One) Time Per Week.   6/1/2025       Allergies:  Morphine    ROS:    Pertinent items are noted in HPI     Objective     Blood pressure 152/92, pulse 98, temperature 98 °F (36.7 °C), temperature source Temporal, resp. rate 10, height 180.3 cm (71\"), weight 106 kg (233 lb 0.4 oz), SpO2 95%.    Physical Exam   Constitutional: Pt is oriented to person, place, and time and well-developed, well-nourished, and in no distress.   Mouth/Throat: Oropharynx is clear and moist.   Neck: Normal range of motion.   Cardiovascular: Normal rate, regular rhythm and normal heart sounds.    Pulmonary/Chest: Effort normal and breath sounds normal.   Abdominal: Soft. Nontender  Skin: Skin is warm and dry.   Psychiatric: Mood, memory, affect and judgment normal.     Assessment & Plan     Diagnosis:  Surveillance colonoscopy    Anticipated Surgical Procedure:  Colonoscopy    The risks, benefits, and alternatives of this procedure have been discussed with the patient or the responsible party- the patient understands and agrees to proceed.           "

## 2025-06-12 NOTE — TELEPHONE ENCOUNTER
Procedure:Pain Pump Replacement    Med Directive:Aspirin    PMH:Abnormal EKG (low risk stress 5/27), HTN, HLD    Last Seen:05/01/2025

## 2025-06-12 NOTE — ANESTHESIA POSTPROCEDURE EVALUATION
Patient: Francisco Colin    Procedure Summary       Date: 06/12/25 Room / Location: [facility] ENDOSCOPY 3 / [facility] ENDOSCOPY    Anesthesia Start: 0840 Anesthesia Stop: 0914    Procedure: COLONOSCOPY Diagnosis:       History of colon polyps      Screening for malignant neoplasm of colon      (History of colon polyps [Z86.0100])      (Screening for malignant neoplasm of colon [Z12.11])    Surgeons: Rosetta Barclay MD Provider: Jt Avina CRNA    Anesthesia Type: general ASA Status: 3            Anesthesia Type: general    Vitals  Vitals Value Taken Time   /86 06/12/25 09:28   Temp 36.7 °C (98 °F) 06/12/25 09:27   Pulse 74 06/12/25 09:28   Resp 12 06/12/25 09:27   SpO2 95 % 06/12/25 09:28   Vitals shown include unfiled device data.        Post Anesthesia Care and Evaluation    Post-procedure mental status: acceptable.  Pain management: satisfactory to patient    Airway patency: patent  Anesthetic complications: No anesthetic complications    Cardiovascular status: acceptable  Respiratory status: acceptable    Comments: Per chart review

## (undated) DEVICE — SOL IRRG H2O PL/BG 1000ML STRL

## (undated) DEVICE — LINER SURG CANSTR SXN S/RIGD 1500CC

## (undated) DEVICE — Device

## (undated) DEVICE — SOLIDIFIER LIQLOC PLS 1500CC BT

## (undated) DEVICE — CONN JET HYDRA H20 AUXILIARY DISP

## (undated) DEVICE — DEFENDO AIR WATER SUCTION AND BIOPSY VALVE KIT FOR  OLYMPUS: Brand: DEFENDO AIR/WATER/SUCTION AND BIOPSY VALVE

## (undated) DEVICE — THE STERILE LIGHT HANDLE COVER IS USED WITH STERIS SURGICAL LIGHTING AND VISUALIZATION SYSTEMS.

## (undated) DEVICE — Device: Brand: DEFENDO AIR/WATER/SUCTION AND BIOPSY VALVE